# Patient Record
Sex: FEMALE | Race: WHITE | NOT HISPANIC OR LATINO | Employment: UNEMPLOYED | URBAN - METROPOLITAN AREA
[De-identification: names, ages, dates, MRNs, and addresses within clinical notes are randomized per-mention and may not be internally consistent; named-entity substitution may affect disease eponyms.]

---

## 2018-04-03 ENCOUNTER — OFFICE VISIT CONVERTED (OUTPATIENT)
Dept: NEUROLOGY | Facility: CLINIC | Age: 53
End: 2018-04-03
Attending: PSYCHIATRY & NEUROLOGY

## 2018-09-10 ENCOUNTER — CONVERSION ENCOUNTER (OUTPATIENT)
Dept: SURGERY | Facility: CLINIC | Age: 53
End: 2018-09-10

## 2018-09-10 ENCOUNTER — OFFICE VISIT CONVERTED (OUTPATIENT)
Dept: UROLOGY | Facility: CLINIC | Age: 53
End: 2018-09-10
Attending: UROLOGY

## 2018-09-12 ENCOUNTER — CONVERSION ENCOUNTER (OUTPATIENT)
Dept: NEUROLOGY | Facility: CLINIC | Age: 53
End: 2018-09-12

## 2018-09-12 ENCOUNTER — OFFICE VISIT CONVERTED (OUTPATIENT)
Dept: NEUROLOGY | Facility: CLINIC | Age: 53
End: 2018-09-12
Attending: PSYCHIATRY & NEUROLOGY

## 2018-10-25 ENCOUNTER — OFFICE VISIT CONVERTED (OUTPATIENT)
Dept: NEUROLOGY | Facility: CLINIC | Age: 53
End: 2018-10-25
Attending: PSYCHIATRY & NEUROLOGY

## 2018-11-21 ENCOUNTER — OFFICE VISIT CONVERTED (OUTPATIENT)
Dept: UROLOGY | Facility: CLINIC | Age: 53
End: 2018-11-21
Attending: UROLOGY

## 2018-11-21 ENCOUNTER — CONVERSION ENCOUNTER (OUTPATIENT)
Dept: SURGERY | Facility: CLINIC | Age: 53
End: 2018-11-21

## 2019-03-26 ENCOUNTER — OFFICE VISIT CONVERTED (OUTPATIENT)
Dept: NEUROLOGY | Facility: CLINIC | Age: 54
End: 2019-03-26
Attending: PSYCHIATRY & NEUROLOGY

## 2019-03-26 ENCOUNTER — CONVERSION ENCOUNTER (OUTPATIENT)
Dept: NEUROLOGY | Facility: CLINIC | Age: 54
End: 2019-03-26

## 2019-05-17 ENCOUNTER — OFFICE VISIT CONVERTED (OUTPATIENT)
Dept: NEUROLOGY | Facility: CLINIC | Age: 54
End: 2019-05-17
Attending: PSYCHIATRY & NEUROLOGY

## 2019-06-13 ENCOUNTER — CONVERSION ENCOUNTER (OUTPATIENT)
Dept: SURGERY | Facility: CLINIC | Age: 54
End: 2019-06-13

## 2019-06-13 ENCOUNTER — OFFICE VISIT CONVERTED (OUTPATIENT)
Dept: UROLOGY | Facility: CLINIC | Age: 54
End: 2019-06-13
Attending: UROLOGY

## 2019-07-05 ENCOUNTER — OFFICE VISIT CONVERTED (OUTPATIENT)
Dept: NEUROLOGY | Facility: CLINIC | Age: 54
End: 2019-07-05
Attending: PSYCHIATRY & NEUROLOGY

## 2019-08-13 ENCOUNTER — CONVERSION ENCOUNTER (OUTPATIENT)
Dept: NEUROLOGY | Facility: CLINIC | Age: 54
End: 2019-08-13

## 2019-08-13 ENCOUNTER — OFFICE VISIT CONVERTED (OUTPATIENT)
Dept: NEUROLOGY | Facility: CLINIC | Age: 54
End: 2019-08-13
Attending: PSYCHIATRY & NEUROLOGY

## 2019-09-09 ENCOUNTER — OFFICE VISIT CONVERTED (OUTPATIENT)
Dept: NEUROLOGY | Facility: CLINIC | Age: 54
End: 2019-09-09
Attending: PSYCHIATRY & NEUROLOGY

## 2019-09-26 ENCOUNTER — OFFICE VISIT CONVERTED (OUTPATIENT)
Dept: ORTHOPEDIC SURGERY | Facility: CLINIC | Age: 54
End: 2019-09-26
Attending: ORTHOPAEDIC SURGERY

## 2019-10-21 ENCOUNTER — HOSPITAL ENCOUNTER (OUTPATIENT)
Dept: PERIOP | Facility: HOSPITAL | Age: 54
Setting detail: HOSPITAL OUTPATIENT SURGERY
Discharge: HOME | End: 2019-10-21
Attending: ORTHOPAEDIC SURGERY

## 2019-11-05 ENCOUNTER — OFFICE VISIT CONVERTED (OUTPATIENT)
Dept: ORTHOPEDIC SURGERY | Facility: CLINIC | Age: 54
End: 2019-11-05
Attending: ORTHOPAEDIC SURGERY

## 2019-11-14 ENCOUNTER — OFFICE VISIT CONVERTED (OUTPATIENT)
Dept: ORTHOPEDIC SURGERY | Facility: CLINIC | Age: 54
End: 2019-11-14
Attending: ORTHOPAEDIC SURGERY

## 2019-12-19 ENCOUNTER — CONVERSION ENCOUNTER (OUTPATIENT)
Dept: ORTHOPEDIC SURGERY | Facility: CLINIC | Age: 54
End: 2019-12-19

## 2019-12-19 ENCOUNTER — OFFICE VISIT CONVERTED (OUTPATIENT)
Dept: ORTHOPEDIC SURGERY | Facility: CLINIC | Age: 54
End: 2019-12-19
Attending: PHYSICIAN ASSISTANT

## 2020-01-30 ENCOUNTER — CONVERSION ENCOUNTER (OUTPATIENT)
Dept: ORTHOPEDIC SURGERY | Facility: CLINIC | Age: 55
End: 2020-01-30

## 2020-01-30 ENCOUNTER — OFFICE VISIT CONVERTED (OUTPATIENT)
Dept: ORTHOPEDIC SURGERY | Facility: CLINIC | Age: 55
End: 2020-01-30
Attending: PHYSICIAN ASSISTANT

## 2020-02-06 ENCOUNTER — OFFICE VISIT CONVERTED (OUTPATIENT)
Dept: ORTHOPEDIC SURGERY | Facility: CLINIC | Age: 55
End: 2020-02-06
Attending: PHYSICIAN ASSISTANT

## 2020-03-27 ENCOUNTER — TELEMEDICINE CONVERTED (OUTPATIENT)
Dept: ORTHOPEDIC SURGERY | Facility: CLINIC | Age: 55
End: 2020-03-27
Attending: ORTHOPAEDIC SURGERY

## 2020-05-15 ENCOUNTER — OFFICE VISIT CONVERTED (OUTPATIENT)
Dept: ORTHOPEDIC SURGERY | Facility: CLINIC | Age: 55
End: 2020-05-15
Attending: ORTHOPAEDIC SURGERY

## 2020-05-15 ENCOUNTER — CONVERSION ENCOUNTER (OUTPATIENT)
Dept: ORTHOPEDIC SURGERY | Facility: CLINIC | Age: 55
End: 2020-05-15

## 2020-06-26 ENCOUNTER — OFFICE VISIT CONVERTED (OUTPATIENT)
Dept: NEUROLOGY | Facility: CLINIC | Age: 55
End: 2020-06-26
Attending: PSYCHIATRY & NEUROLOGY

## 2020-07-02 ENCOUNTER — OFFICE VISIT CONVERTED (OUTPATIENT)
Dept: ORTHOPEDIC SURGERY | Facility: CLINIC | Age: 55
End: 2020-07-02
Attending: ORTHOPAEDIC SURGERY

## 2021-05-13 NOTE — PROGRESS NOTES
Progress Note      Patient Name: Katrin Parham   Patient ID: [id number]   Sex: Female   YOB: 1965    Primary Care Provider: Pat ANDERSON   Referring Provider: Pat ANDERSON    Visit Date: June 26, 2020    Provider: Bret Hartman MD   Location: [facility]   Location Address: [address] [place] [address]  [id number]   Location Phone: [phone]          Chief Complaint     RUE pain numbness and tingling       History Of Present Illness  Katrin Parham is a 55 year old /White female who presents today to [facility] today referred from Pat ANDERSON.      55-year-old woman here for follow-up of nerve conduction study.  She had carpal tunnel surgery to the right hand in October and she states that did not get any better.  She still gets numbness and tingling in her hand when she does activities of daily living and she drops things all the time.  Her left hand is not doing as much as the right hand.  She had carpal tunnel surgery on the left hand in the past and it was more successful.       Past Medical History  Abnormal MRI; Acute cystitis; Allergic rhinitis, chronic; Arthritis; Bladder Disorder; Bladder spasms; Chest pain; Chronic migraine; Degenerative Disc Disease ; Heart Disease; High blood pressure; Hyperlipemia; Hyperlipidemia; Hypertension; Incomplete bladder emptying; Leg pain; Limb Pain; Limb Swelling; Migraines; Multiple sclerosis; Muscle cramps; Neurogenic bladder; Pain; Pelvic floor dysfunction; Reflux; Alexandru Mountain spotted fever; Seasonal allergies; Self-catheterizes urinary bladder; Urinary Retention         Past Surgical History  Arthroscopic Knee Surgery; Eye Implant; EYE SURGERY; Hand surgery; Hysterectomy; Hysterectomy-Abdominal         Medication List  amitriptyline 100 mg oral tablet; Carafate oral; CBD oil; Crestor 10 mg oral tablet; esomeprazole magnesium oral; famotidine oral; Hyzaar 50-12.5 mg oral  tablet; losartan-hydrochlorothiazide 50-12.5 mg oral tablet; memantine oral; Neurontin 600 mg oral tablet; oxycodone 10 mg oral tablet; Prozac 40 mg oral capsule; Topamax 100 mg oral tablet; Voltaren 1 % topical gel; Zanaflex 4 mg oral capsule         Allergy List  Codeine Phosphate; Codeine Sulfate; erythromycin; hydrocodone-acetaminophen; methadone; SULFA (SULFONAMIDES)         Family Medical History  Liver Neoplasm, Malignant; Stomach Neoplasm, Malignant; Stroke; Heart Disease; Cancer, Unspecified; Diabetes, unspecified type; Esophagus Neoplasm, Malignant; Osteoporosis; Family history of Arthritis; Family history of cancer; Family history of heart disease; Family history of diabetes mellitus; Family history of osteoporosis         Social History  Alcohol (Never); Alcohol Use (Never); Caffeine (Current some day); Denies substance abuse (Never); lives with spouse; ; .; Recreational Drug Use (Never); Tobacco (Current every day)         Review of Systems  · Constitutional  o Denies  o : chills, excessive sweating, fatigue, fever, sycope/passing out, weight gain, weight loss  · Eyes  o Denies  o : changes in vision, blurry vision, double vision  · HENT  o Denies  o : loss of hearing, ringing in the ears, ear aches, sore throat, nasal congestion, sinus pain, nose bleeds, seasonal allergies  · Cardiovascular  o Denies  o : blood clots, swollen legs, anemia, easy burising or bleeding, transfusions  · Respiratory  o Denies  o : shortness of breath, dry cough, productive cough, pneumonia, COPD  · Gastrointestinal  o Denies  o : difficulty swallowing, reflux  · Genitourinary  o Denies  o : incontinence  · Neurologic  o Admits  o : difficulty with sleep  o Denies  o : headache, seizure, stroke, tremor, loss of balance, falls, dizziness/vertigo, numbness/tingling/paresthesia , difficulty with coordination, difficulty with dexterity, weakness  · Musculoskeletal  o Admits  o : neck stiffness/pain, weakness, pain  "radiating in arm  o Denies  o : swollen lymph nodes, muscle aches, joint pain, spasms, sciatica, pain radiating in leg, low back pain  · Endocrine  o Denies  o : diabetes, thyroid disorder  · Psychiatric  o Denies  o : anxiety, depression      Vitals  Date Time BP Position Site L\R Cuff Size HR RR TEMP (F) WT  HT  BMI kg/m2 BSA m2 O2 Sat HC       06/26/2020 02:23 /90 Sitting    100 - R 18 98.2 200lbs 0oz 5'  1\" 37.79 1.98           Physical Examination     There is no weakness of the upper extremities individual muscle testing.  There is no weakness of the thumb muscles.  Phalen sign is positive on the right hand.  Pressure there is presence tingling in both hands.  Tinel sign is positive in the right hand.           Assessment  · Carpal tunnel syndrome     354.0/G56.00  She is to follow-up with Dr. Dowell and perhaps do the carpal tunnel surgery again.  · Numbness and tingling       Anesthesia of skin     782.0/R20.0  Paresthesia of skin     782.0/R20.2  This study is abnormal and shows electrophysiologic evidence for moderate carpal tunnel syndrome on the right side and mild on the left side. This is essentially unchanged from the nerve conduction study that was performed last year.    Problems Reconciled  Plan  · Orders  o Nerve conduction studies; 5-6 studies (80742) - 354.0/G56.00, 782.0/R20.0, 782.0/R20.2 - 06/26/2020  · Medications  o Medications have been Reconciled  o Transition of Care or Provider Policy  · Instructions  o Encouraged to follow-up with Primary Care Provider for preventative care.            Electronically Signed by: Bret Hartman MD -Author on June 26, 2020 04:00:24 PM  "

## 2021-05-13 NOTE — PROGRESS NOTES
Progress Note      Patient Name: Katrin Parham   Patient ID: [id number]   Sex: Female   YOB: 1965    Primary Care Provider: Pat ANDERSON   Referring Provider: Pat ANDERSON    Visit Date: July 2, 2020    Provider: Zach Dowell MD   Location: [facility]   Location Address: [address] [place] [address]  [id number]   Location Phone: [phone]          Chief Complaint  · Follow up Bilateral upper extremities       History Of Present Illness  Katrin Parham is a 55 year old /White female who presents today to [facility].      The patient presents today for a follow up with her wrist pain. She had carpal tunnel surgery to the right hand in October and she states that did not get any better.  She still gets numbness and tingling in her hand when she does activities of daily living and she drops things all the time.  She is still having a lot of trouble.              Past Medical History  Abnormal MRI; Acute cystitis; Allergic rhinitis, chronic; Arthritis; Bladder Disorder; Bladder spasms; Chest pain; Chronic migraine; Degenerative Disc Disease ; Heart Disease; High blood pressure; Hyperlipemia; Hyperlipidemia; Hypertension; Incomplete Bladder Emptying; Leg pain; Limb Pain; Limb Swelling; Migraines; Multiple sclerosis; Muscle cramps; Neurogenic Bladder; Pain; Pelvic floor dysfunction; Reflux; Alexandru Mountain spotted fever; Seasonal allergies; Self-catheterizes urinary bladder; Urinary Retention         Past Surgical History  Arthroscopic Knee Surgery; Eye Implant; EYE SURGERY; Hand surgery; Hysterectomy; Hysterectomy-Abdominal         Medication List  amitriptyline 100 mg oral tablet; Carafate oral; CBD oil; Crestor 10 mg oral tablet; esomeprazole magnesium oral; famotidine oral; Hyzaar 50-12.5 mg oral tablet; losartan-hydrochlorothiazide 50-12.5 mg oral tablet; memantine oral; Neurontin 600 mg oral tablet; oxycodone 10 mg oral tablet; Prozac  "40 mg oral capsule; Topamax 100 mg oral tablet; Voltaren 1 % topical gel; Zanaflex 4 mg oral capsule         Allergy List  Codeine Phosphate; Codeine Sulfate; erythromycin; hydrocodone-acetaminophen; methadone; SULFA (SULFONAMIDES)         Family Medical History  Liver Neoplasm, Malignant; Stomach Neoplasm, Malignant; Stroke; Heart Disease; Cancer, Unspecified; Diabetes, unspecified type; Esophagus Neoplasm, Malignant; Osteoporosis; Family history of Arthritis; Family history of cancer; Family history of heart disease; Family history of diabetes mellitus; Family history of osteoporosis         Social History  Alcohol (Never); Alcohol Use (Never); Caffeine (Current some day); Denies substance abuse (Never); lives with spouse; ; .; Recreational Drug Use (Never); Tobacco (Current every day)         Review of Systems  · Constitutional  o Denies  o : fever, chills, weight loss  · Cardiovascular  o Denies  o : chest pain, shortness of breath  · Gastrointestinal  o Denies  o : liver disease, heartburn, nausea, blood in stools  · Genitourinary  o Denies  o : painful urination, blood in urine  · Integument  o Denies  o : rash, itching  · Neurologic  o Denies  o : headache, weakness, loss of consciousness  · Musculoskeletal  o Denies  o : painful, swollen joints  · Psychiatric  o Denies  o : drug/alcohol addiction, anxiety, depression      Vitals  Date Time BP Position Site L\R Cuff Size HR RR TEMP (F) WT  HT  BMI kg/m2 BSA m2 O2 Sat        07/02/2020 10:33 AM      90 - R   198lbs 2oz 5'  1\" 37.44 1.97 95 %          Physical Examination  · Constitutional  o Appearance  o : well developed, well-nourished, no obvious deformities present  · Head and Face  o Head  o :   § Inspection  § : normocephalic  o Face  o :   § Inspection  § : no facial lesions  · Eyes  o Conjunctivae  o : conjunctivae normal  o Sclerae  o : sclerae white  · Ears, Nose, Mouth and Throat  o Ears  o :   § External Ears  § : appearance within " normal limits  § Hearing  § : intact  o Nose  o :   § External Nose  § : appearance normal  · Neck  o Inspection/Palpation  o : normal appearance  o Range of Motion  o : full range of motion  · Respiratory  o Respiratory Effort  o : breathing unlabored  o Inspection of Chest  o : normal appearance  o Auscultation of Lungs  o : no audible wheezing or rales  · Cardiovascular  o Heart  o : regular rate  · Gastrointestinal  o Abdominal Examination  o : soft and non-tender  · Skin and Subcutaneous Tissue  o General Inspection  o : intact, no rashes  · Psychiatric  o General  o : Alert and oriented x3  o Judgement and Insight  o : judgment and insight intact  o Mood and Affect  o : mood normal, affect appropriate  · Extremities  o Extremities  o : Bilateral wrist : Well healed scar to the hand, Rom is intact, sensation is intact.              Assessment  · Carpal Tunnel Syndrome : Right     354.0/G56.00  · Pain in both wrists       Pain in right wrist     719.43/M25.531  Pain in left wrist     719.43/M25.532  · Arthritis : Right Wrist     716.90/M19.90  · Right wrist cyst     727.51/M71.20      Plan  · Medications  o Medications have been Reconciled  o Transition of Care or Provider Policy  · Instructions  o Dr. Dowell saw and examined the patient and agrees with plan.   o Reviewed the patient's Past Medical, Social, and Family history as well as the ROS at today's visit, no changes.  o Call or return if worsening symptoms.  o The above service was scribed by Rush Crocker on my behalf and I attest to the accuracy of the note. jsb  o We discussed the plan with the patient, we discussed having surgery- re-release carpal tunnel. If the wrist is having a lot of trouble. We will refer her to a hand surgeon for an evaluation.             Electronically Signed by: Ronal Crocker - , Other -Author on July 8, 2020 10:32:52 AM  Electronically Co-signed by: Zach Dowell MD -Reviewer on July 8, 2020  08:54:41 PM

## 2021-05-13 NOTE — PROGRESS NOTES
Progress Note      Patient Name: Katrin Parham   Patient ID: [id number]   Sex: Female   YOB: 1965    Primary Care Provider: Pat ANDERSON   Referring Provider: Pat ANDERSON    Visit Date: May 15, 2020    Provider: Zach Dowell MD   Location: [facility]   Location Address: [address] [place] [address]  [id number]   Location Phone: [phone]          Chief Complaint  · Right wrist pain      History Of Present Illness  Katrin Parham is a 55 year old /White female who presents today to [facility].      The patient presents today for evaluation of right wrist and thumb. She was last evaluated by tele health visit on March 27th. We had referred her to  [facility] for her hand and thumb as she had degenerative changes noted on her MRI with a possible synovial cyst. However the consultation was not very helpful. I reviewed their note today and basically they say she is only having residual carpal tunnel pillar pain and a referral back to the treating surgeon. They did not treat the other findings on her MRI. She reports thumb pain as well as wrist pain today. She complains of numbness and tingling as well as pain in her thumb. She reports a nodule over the lower MCP joint of the thumb with catching and locking of the thumb. She has not tried any treatment of this recently.       Past Medical History  Abnormal MRI; Acute cystitis; Allergic rhinitis, chronic; Arthritis; Bladder Disorder; Bladder spasms; Chest pain; Chronic migraine; Degenerative Disc Disease ; Heart Disease; High blood pressure; Hyperlipemia; Hyperlipidemia; Hypertension; Incomplete bladder emptying; Leg pain; Limb Pain; Limb Swelling; Migraines; Multiple sclerosis; Muscle cramps; Neurogenic bladder; Pain; Pelvic floor dysfunction; Reflux; Alexandru Mountain spotted fever; Seasonal allergies; Self-catheterizes urinary bladder; Urinary Retention         Past Surgical  History  Arthroscopic Knee Surgery; Eye Implant; EYE SURGERY; Hand surgery; Hysterectomy; Hysterectomy-Abdominal         Medication List  amitriptyline 100 mg oral tablet; Carafate oral; CBD oil; Crestor 10 mg oral tablet; esomeprazole magnesium oral; famotidine oral; Hyzaar 50-12.5 mg oral tablet; losartan-hydrochlorothiazide 50-12.5 mg oral tablet; memantine oral; Neurontin 600 mg oral tablet; oxycodone 10 mg oral tablet; Prozac 40 mg oral capsule; Topamax 100 mg oral tablet; Voltaren 1 % topical gel; Zanaflex 4 mg oral capsule         Allergy List  Codeine Phosphate; Codeine Sulfate; erythromycin; hydrocodone-acetaminophen; methadone; SULFA (SULFONAMIDES)         Family Medical History  Liver Neoplasm, Malignant; Stomach Neoplasm, Malignant; Stroke; Heart Disease; Cancer, Unspecified; Diabetes, unspecified type; Esophagus Neoplasm, Malignant; Osteoporosis; Family history of Arthritis; Family history of cancer; Family history of heart disease; Family history of diabetes mellitus; Family history of osteoporosis         Social History  Alcohol (Never); Alcohol Use (Never); Caffeine (Current some day); Denies substance abuse (Never); lives with spouse; ; .; Recreational Drug Use (Never); Tobacco (Current every day)         Review of Systems  · Constitutional  o Denies  o : fever, chills, weight loss  · Cardiovascular  o Denies  o : chest pain, shortness of breath  · Gastrointestinal  o Denies  o : liver disease, heartburn, nausea, blood in stools  · Genitourinary  o Denies  o : painful urination, blood in urine  · Integument  o Denies  o : rash, itching  · Neurologic  o Denies  o : headache, weakness, loss of consciousness  · Musculoskeletal  o Denies  o : painful, swollen joints  · Psychiatric  o Denies  o : drug/alcohol addiction, anxiety, depression      Vitals  Date Time BP Position Site L\R Cuff Size HR RR TEMP (F) WT  HT  BMI kg/m2 BSA m2 O2 Sat        05/15/2020 10:03 AM         193lbs 16oz 5'  " 1\" 36.66 1.95           Physical Examination  · Constitutional  o Appearance  o : well developed, well-nourished, no obvious deformities present  · Head and Face  o Head  o :   § Inspection  § : normocephalic  o Face  o :   § Inspection  § : no facial lesions  · Eyes  o Conjunctivae  o : conjunctivae normal  o Sclerae  o : sclerae white  · Ears, Nose, Mouth and Throat  o Ears  o :   § External Ears  § : appearance within normal limits  § Hearing  § : intact  o Nose  o :   § External Nose  § : appearance normal  · Neck  o Inspection/Palpation  o : normal appearance  o Range of Motion  o : full range of motion  · Respiratory  o Respiratory Effort  o : breathing unlabored  o Inspection of Chest  o : normal appearance  o Auscultation of Lungs  o : no audible wheezing or rales  · Cardiovascular  o Heart  o : regular rate  · Gastrointestinal  o Abdominal Examination  o : soft and non-tender  · Skin and Subcutaneous Tissue  o General Inspection  o : intact, no rashes  · Psychiatric  o General  o : Alert and oriented x3  o Judgement and Insight  o : judgment and insight intact  o Mood and Affect  o : mood normal, affect appropriate  · Right Wrist  o Inspection  o : Tender over the radial ulnar pillars of the wrist more so on the radial side. Tenderness over the MCP joint. Pain with grind test. Mild tenderness thumb CMC joint. Mild tenderness over IP joint. No out right catching or locking but there is some pain with flexion and extension of the thumb. Positive Tinnel. Incision is well healed.   · Injection Note/Aspiration Note  o Site  o : Right Thumb  o Procedure  o : Procedure: After educating the patient, patient gave consent for procedure. After using Chloraprep, the joint space was injected. The patient tolerated the procedure well.   o Medication  o : 80 mg of DepoMedrol with 1cc of 1% Lidocaine          Assessment  · Right wrist pain     719.43/M25.531  · Right trigger thumb     727.03/M65.30  · Thumb pain, " right     729.5/M79.644  · Osteoarthritis: thumb     715.90/M19.90  · Paresthesia of the right hand     782.0/R20.2      Plan  · Orders  o Depo-Medrol injection 80mg () - - 05/15/2020   Lot [id number] Exp 05 2021 [employer] Administered by CIARA Dowell MD  o Arthrocentesis of minor joint (20600) - - 05/15/2020   Lot [id number] 07 01 2021 [employer] Administered by CIARA Dowell MD  · Medications  o Medications have been Reconciled  o Transition of Care or Provider Policy  · Instructions  o Dr. Dowell saw and examined the patient and agrees with plan.   o Reviewed the patient's Past Medical, Social, and Family history as well as the ROS at today's visit, no changes.  o Call or return if worsening symptoms.  o This note was transcribed by Rush Crocker. jsb.  o Discussed plan and treatment options with patient. Presentation at this point with some carpal tunnel type symptoms. Post operative pillar pain from previous carpal tunnel surgery, however she has generalized wrist pain including the thumb. She would like to try initial trigger thumb injection. We will set up a nerve conduction study and we will follow up after study to evaluate.             Electronically Signed by: Ronal Crocker - , Other -Author on May 22, 2020 10:48:22 AM  Electronically Co-signed by: Zach Dowell MD -Reviewer on May 25, 2020 10:40:57 PM

## 2021-05-15 VITALS — HEIGHT: 61 IN | WEIGHT: 194 LBS | BODY MASS INDEX: 36.63 KG/M2

## 2021-05-15 VITALS
RESPIRATION RATE: 18 BRPM | WEIGHT: 200 LBS | DIASTOLIC BLOOD PRESSURE: 90 MMHG | TEMPERATURE: 98.2 F | SYSTOLIC BLOOD PRESSURE: 140 MMHG | HEIGHT: 61 IN | BODY MASS INDEX: 37.76 KG/M2 | HEART RATE: 100 BPM

## 2021-05-15 VITALS
DIASTOLIC BLOOD PRESSURE: 86 MMHG | SYSTOLIC BLOOD PRESSURE: 130 MMHG | WEIGHT: 195.5 LBS | HEIGHT: 61 IN | BODY MASS INDEX: 36.91 KG/M2

## 2021-05-15 VITALS
BODY MASS INDEX: 37.38 KG/M2 | HEART RATE: 90 BPM | HEIGHT: 61 IN | WEIGHT: 198 LBS | DIASTOLIC BLOOD PRESSURE: 95 MMHG | SYSTOLIC BLOOD PRESSURE: 143 MMHG

## 2021-05-15 VITALS — OXYGEN SATURATION: 96 % | BODY MASS INDEX: 37.57 KG/M2 | HEIGHT: 61 IN | HEART RATE: 100 BPM | WEIGHT: 199 LBS

## 2021-05-15 VITALS — HEART RATE: 98 BPM | BODY MASS INDEX: 35.5 KG/M2 | HEIGHT: 61 IN | OXYGEN SATURATION: 94 % | WEIGHT: 188 LBS

## 2021-05-15 VITALS
BODY MASS INDEX: 35.87 KG/M2 | HEIGHT: 61 IN | DIASTOLIC BLOOD PRESSURE: 93 MMHG | WEIGHT: 190 LBS | SYSTOLIC BLOOD PRESSURE: 140 MMHG | HEART RATE: 100 BPM

## 2021-05-15 VITALS — WEIGHT: 196.5 LBS | OXYGEN SATURATION: 99 % | BODY MASS INDEX: 37.1 KG/M2 | HEART RATE: 112 BPM | HEIGHT: 61 IN

## 2021-05-15 VITALS — OXYGEN SATURATION: 96 % | HEIGHT: 61 IN | HEART RATE: 101 BPM

## 2021-05-15 VITALS — HEART RATE: 103 BPM | OXYGEN SATURATION: 97 % | HEIGHT: 61 IN

## 2021-05-15 VITALS — HEIGHT: 61 IN | BODY MASS INDEX: 36.63 KG/M2 | WEIGHT: 194 LBS

## 2021-05-15 VITALS
SYSTOLIC BLOOD PRESSURE: 141 MMHG | DIASTOLIC BLOOD PRESSURE: 96 MMHG | WEIGHT: 197.25 LBS | HEIGHT: 61 IN | BODY MASS INDEX: 37.24 KG/M2 | HEART RATE: 87 BPM

## 2021-05-15 VITALS — BODY MASS INDEX: 37.41 KG/M2 | HEART RATE: 90 BPM | HEIGHT: 61 IN | OXYGEN SATURATION: 95 % | WEIGHT: 198.12 LBS

## 2021-05-15 VITALS — WEIGHT: 193.5 LBS | BODY MASS INDEX: 36.53 KG/M2 | HEIGHT: 61 IN | OXYGEN SATURATION: 98 % | HEART RATE: 105 BPM

## 2021-05-16 VITALS
DIASTOLIC BLOOD PRESSURE: 68 MMHG | HEART RATE: 64 BPM | HEIGHT: 61 IN | SYSTOLIC BLOOD PRESSURE: 141 MMHG | WEIGHT: 183 LBS | BODY MASS INDEX: 34.55 KG/M2

## 2021-05-16 VITALS
WEIGHT: 183.37 LBS | DIASTOLIC BLOOD PRESSURE: 94 MMHG | HEIGHT: 61 IN | BODY MASS INDEX: 34.62 KG/M2 | SYSTOLIC BLOOD PRESSURE: 148 MMHG

## 2021-05-16 VITALS
WEIGHT: 186.31 LBS | HEIGHT: 61 IN | DIASTOLIC BLOOD PRESSURE: 69 MMHG | SYSTOLIC BLOOD PRESSURE: 117 MMHG | HEART RATE: 81 BPM | BODY MASS INDEX: 35.18 KG/M2

## 2021-05-16 VITALS
WEIGHT: 180 LBS | HEIGHT: 61 IN | SYSTOLIC BLOOD PRESSURE: 144 MMHG | DIASTOLIC BLOOD PRESSURE: 90 MMHG | BODY MASS INDEX: 33.99 KG/M2

## 2021-05-16 VITALS
HEIGHT: 61 IN | WEIGHT: 180.19 LBS | DIASTOLIC BLOOD PRESSURE: 91 MMHG | HEART RATE: 82 BPM | SYSTOLIC BLOOD PRESSURE: 151 MMHG | BODY MASS INDEX: 34.02 KG/M2

## 2022-03-07 ENCOUNTER — OFFICE VISIT (OUTPATIENT)
Dept: OTOLARYNGOLOGY | Facility: CLINIC | Age: 57
End: 2022-03-07

## 2022-03-07 ENCOUNTER — PROCEDURE VISIT (OUTPATIENT)
Dept: OTOLARYNGOLOGY | Facility: CLINIC | Age: 57
End: 2022-03-07

## 2022-03-07 VITALS — TEMPERATURE: 98.1 F | HEIGHT: 61 IN | BODY MASS INDEX: 39.31 KG/M2 | WEIGHT: 208.2 LBS

## 2022-03-07 DIAGNOSIS — R42 DIZZINESS AND GIDDINESS: ICD-10-CM

## 2022-03-07 DIAGNOSIS — H93.13 TINNITUS OF BOTH EARS: ICD-10-CM

## 2022-03-07 DIAGNOSIS — H90.3 SENSORINEURAL HEARING LOSS (SNHL) OF BOTH EARS: Primary | ICD-10-CM

## 2022-03-07 DIAGNOSIS — H93.8X3 EAR PRESSURE, BILATERAL: ICD-10-CM

## 2022-03-07 DIAGNOSIS — H90.3 SENSORY HEARING LOSS, BILATERAL: ICD-10-CM

## 2022-03-07 DIAGNOSIS — M26.69 TMJ INFLAMMATION: ICD-10-CM

## 2022-03-07 PROCEDURE — 99203 OFFICE O/P NEW LOW 30 MIN: CPT | Performed by: NURSE PRACTITIONER

## 2022-03-07 PROCEDURE — 92567 TYMPANOMETRY: CPT | Performed by: AUDIOLOGIST

## 2022-03-07 PROCEDURE — 92557 COMPREHENSIVE HEARING TEST: CPT | Performed by: AUDIOLOGIST

## 2022-03-07 RX ORDER — RIMEGEPANT SULFATE 75 MG/75MG
TABLET, ORALLY DISINTEGRATING ORAL
COMMUNITY
Start: 2022-03-03

## 2022-03-07 RX ORDER — DESVENLAFAXINE SUCCINATE 50 MG/1
50 TABLET, EXTENDED RELEASE ORAL DAILY
COMMUNITY
Start: 2022-02-19

## 2022-03-07 RX ORDER — VALSARTAN 160 MG/1
TABLET ORAL
COMMUNITY
Start: 2022-02-14

## 2022-03-07 RX ORDER — FEXOFENADINE HCL 180 MG/1
TABLET ORAL
COMMUNITY

## 2022-03-07 RX ORDER — FAMOTIDINE 20 MG/1
TABLET, FILM COATED ORAL
COMMUNITY
Start: 2021-12-22

## 2022-03-07 RX ORDER — ALBUTEROL SULFATE 90 UG/1
AEROSOL, METERED RESPIRATORY (INHALATION)
COMMUNITY
Start: 2022-01-18

## 2022-03-07 RX ORDER — L. ACIDOPHILUS/LACTOBAC SPOR 35MM-25MM
1 TABLET ORAL EVERY MORNING
COMMUNITY
Start: 2022-02-14

## 2022-03-07 RX ORDER — HYOSCYAMINE SULFATE 0.12 MG/1
TABLET SUBLINGUAL
COMMUNITY

## 2022-03-07 RX ORDER — GALCANEZUMAB 120 MG/ML
120 INJECTION, SOLUTION SUBCUTANEOUS
COMMUNITY
End: 2022-09-30

## 2022-03-07 RX ORDER — AMLODIPINE BESYLATE 5 MG/1
TABLET ORAL
COMMUNITY
Start: 2022-02-14

## 2022-03-07 RX ORDER — LIDOCAINE HYDROCHLORIDE AND EPINEPHRINE 10; 10 MG/ML; UG/ML
1 INJECTION, SOLUTION INFILTRATION; PERINEURAL
COMMUNITY
End: 2022-09-30

## 2022-03-07 RX ORDER — NITROFURANTOIN MACROCRYSTALS 50 MG/1
50 CAPSULE ORAL
COMMUNITY
Start: 2022-02-14

## 2022-03-07 RX ORDER — TIZANIDINE 4 MG/1
TABLET ORAL
COMMUNITY
Start: 2022-02-14

## 2022-03-07 RX ORDER — DEXTROMETHORPHAN HYDROBROMIDE AND PROMETHAZINE HYDROCHLORIDE 15; 6.25 MG/5ML; MG/5ML
SYRUP ORAL
COMMUNITY
Start: 2022-01-18

## 2022-03-07 RX ORDER — FLUCONAZOLE 150 MG/1
TABLET ORAL
COMMUNITY
Start: 2022-01-06 | End: 2022-09-30

## 2022-03-07 RX ORDER — MELOXICAM 15 MG/1
15 TABLET ORAL DAILY
COMMUNITY
Start: 2022-03-02

## 2022-03-07 RX ORDER — AMITRIPTYLINE HYDROCHLORIDE 25 MG/1
25 TABLET, FILM COATED ORAL DAILY
COMMUNITY
Start: 2022-02-14

## 2022-03-07 RX ORDER — PANTOPRAZOLE SODIUM 40 MG/1
40 TABLET, DELAYED RELEASE ORAL 2 TIMES DAILY
COMMUNITY
Start: 2022-02-14

## 2022-03-07 RX ORDER — OXYCODONE HYDROCHLORIDE 10 MG/1
TABLET ORAL
COMMUNITY
Start: 2022-02-17

## 2022-03-07 RX ORDER — LANCETS
EACH MISCELLANEOUS
COMMUNITY
Start: 2022-02-18

## 2022-03-07 RX ORDER — ROSUVASTATIN CALCIUM 10 MG/1
10 TABLET, COATED ORAL DAILY
COMMUNITY
Start: 2022-02-14

## 2022-03-07 RX ORDER — MONTELUKAST SODIUM 10 MG/1
10 TABLET ORAL EVERY MORNING
COMMUNITY
Start: 2022-02-14

## 2022-03-07 RX ORDER — HYDROCHLOROTHIAZIDE 12.5 MG/1
TABLET ORAL
COMMUNITY
Start: 2022-02-14

## 2022-03-07 RX ORDER — BLOOD SUGAR DIAGNOSTIC
STRIP MISCELLANEOUS
COMMUNITY
Start: 2022-02-18

## 2022-03-07 RX ORDER — GABAPENTIN 600 MG/1
1200 TABLET ORAL 3 TIMES DAILY
COMMUNITY
Start: 2022-02-17

## 2022-03-07 RX ORDER — BUTALBITAL, ACETAMINOPHEN AND CAFFEINE 50; 325; 40 MG/1; MG/1; MG/1
TABLET ORAL
COMMUNITY

## 2022-03-07 RX ORDER — CHOLECALCIFEROL (VITAMIN D3) 1250 MCG
50000 CAPSULE ORAL WEEKLY
COMMUNITY
Start: 2022-02-14

## 2022-03-07 RX ORDER — ASPIRIN 81 MG/1
TABLET ORAL
COMMUNITY

## 2022-03-07 NOTE — PROGRESS NOTES
Patient Name: Katrin Parham   Visit Date: 03/07/2022   Patient ID: [id number]  Provider: MONICA Whitaker    Sex: female  Location: [facility]   YOB: 1965  Location Address: [address] [place] [address], [address] [place] [address],?[place]?[id number]    Primary Care Provider Ynes Ceja APRN  Location Phone: [phone]    Referring Provider: MONICA Brower        Chief Complaint  Otitis Media    Subjective   Katrin Parham is a 56 y.o. female who presents to [facility] today as a consult from MONICA Brower for evaluation of her ears.  She states over the past year she has been having a pressure sensation in her ears.  She states she feels it all the time and it is sometimes painful.  She states she feels it whenever she opens her mouth.  She also states she has having bilateral ringing.  She feels like her hearing is decreased.  She states last year she was diagnosed with Ménière's disease by her PCP after experiencing vertigo symptoms.  Upon review of her records it looks like she was diagnosed with BPPV and sent for treatment.  She states she has a history of multiple sclerosis and multiple joint pain issues.  She ambulates with the use of a cane.      Current Outpatient Medications on File Prior to Visit   Medication Sig   • Accu-Chek FastClix Lancets misc Use as directed to monitor blood glucose THREE TIMES DAILY and AS NEEDED   • Accu-Chek Guide test strip Use as directed to monitor blood glucose THREE TIMES DAILY and AS NEEDED   • albuterol sulfate  (90 Base) MCG/ACT inhaler INHALE 2 PUFFS BY MOUTH EVERY 4 HOURS SHAKE WELL   • amitriptyline (ELAVIL) 25 MG tablet Take 25 mg by mouth Daily.   • amLODIPine (NORVASC) 5 MG tablet TAKE 1 TABLET BY MOUTH EVERY DAY WITH VALSARTAN AND HCTZ   • aspirin 81 MG EC tablet aspirin 81 mg tablet,delayed release   • butalbital-acetaminophen-caffeine (FIORICET, ESGIC) -40 MG per tablet  butalbital-acetaminophen-caff -40 mg oral tablet take 1 - 2 tablets by oral route every 4 hours as needed not to exceed 6 tablets per 24hrs   Suspended   • Cholecalciferol (Vitamin D3) 1.25 MG (27002 UT) capsule Take 50,000 Units by mouth 1 (One) Time Per Week.   • desvenlafaxine (PRISTIQ) 50 MG 24 hr tablet Take 50 mg by mouth Daily.   • Erenumab-aooe (AIMOVIG) 70 MG/ML prefilled syringe 1 mL.   • famotidine (PEPCID) 20 MG tablet TAKE 1 TABLET BY MOUTH TWICE DAILY FOR 14 DAYS   • fexofenadine (ALLEGRA) 180 MG tablet fexofenadine 180 mg tablet   TAKE 1 TABLET BY MOUTH EVERY DAY   • fluconazole (DIFLUCAN) 150 MG tablet TAKE 1 TABLET BY MOUTH NOW, REPEAT DOSE IN 4 DAYS   • gabapentin (NEURONTIN) 600 MG tablet Take 1,200 mg by mouth 3 (Three) Times a Day.   • galcanezumab-gnlm (Emgality) 120 MG/ML auto-injector pen 120 mg.   • hydroCHLOROthiazide (HYDRODIURIL) 12.5 MG tablet TAKE 1 TABLET BY MOUTH EVERY DAY ALONG WITH AMLODIPINE AND VALSARTAN   • Hyoscyamine Sulfate SL 0.125 MG sublingual tablet hyoscyamine 0.125 mg sublingual tablet   PLACE 1 TABLET UNDER THE TONGUE EVERY 4 HOURS AS NEEDED   • Lactobacillus (Acidophilus/L-Sporogenes) tablet Take 1 tablet by mouth Every Morning.   • lidocaine 1% - EPINEPHrine 1:210870 (XYLOCAINE W/EPI) 1 %-1:137608 injection 1 mL.   • linaclotide (LINZESS) 145 MCG capsule capsule Linzess 145 mcg capsule   TAKE 1 CAPSULE BY MOUTH EVERY DAY   • meloxicam (MOBIC) 15 MG tablet Take 15 mg by mouth Daily. with food   • metFORMIN (GLUCOPHAGE) 500 MG tablet Take 500 mg by mouth Daily.   • montelukast (SINGULAIR) 10 MG tablet Take 10 mg by mouth Every Morning.   • nitrofurantoin (MACRODANTIN) 50 MG capsule Take 50 mg by mouth every night at bedtime.   • Nurtec 75 MG dispersible tablet Take 1 tablet every other day and as needed daily for migraine prevention   • oxyCODONE (ROXICODONE) 10 MG tablet TAKE 1 TABLET BY MOUTH EVERY 6 HOURS AS NEEDED FOR PAIN MAY CAUSE DROWSINESS   • pantoprazole  "(PROTONIX) 40 MG EC tablet Take 40 mg by mouth 2 (Two) Times a Day.   • promethazine-dextromethorphan (PROMETHAZINE-DM) 6.25-15 MG/5ML syrup TAKE 5ML BY MOUTH EVERY 4 TO 6 HOURS MAY CAUSE DROWSINESS   • rosuvastatin (CRESTOR) 10 MG tablet Take 10 mg by mouth Daily.   • tiZANidine (ZANAFLEX) 4 MG tablet TAKE 1 TO 2 TABLETS BY MOUTH THREE TIMES DAILY AS NEEDED MAY CAUSE DROWSINESS   • valsartan (DIOVAN) 160 MG tablet TAKE 1 TABLET BY MOUTH EVERY DAY along with amlodipine and hctz     No current facility-administered medications on file prior to visit.        Social History     Tobacco Use   • Smoking status: Current Every Day Smoker     Packs/day: 0.50     Years: 10.00     Pack years: 5.00     Types: Cigarettes   • Smokeless tobacco: Never Used   Vaping Use   • Vaping Use: Never used   Substance Use Topics   • Drug use: Defer       Objective     Vital Signs:   Temp 98.1 °F (36.7 °C) (Temporal)   Ht 154.9 cm (61\")   Wt 94.4 kg (208 lb 3.2 oz)   BMI 39.34 kg/m²       Physical Exam  Constitutional:       General: She is not in acute distress.     Appearance: Normal appearance. She is not ill-appearing.   HENT:      Head: Normocephalic and atraumatic.      Jaw: There is normal jaw occlusion. Tenderness and pain on movement present.      Salivary Glands: Right salivary gland is not diffusely enlarged or tender. Left salivary gland is not diffusely enlarged or tender.      Right Ear: Tympanic membrane, ear canal and external ear normal.      Left Ear: Tympanic membrane, ear canal and external ear normal.      Nose: Nose normal. No septal deviation.      Right Sinus: No maxillary sinus tenderness or frontal sinus tenderness.      Left Sinus: No maxillary sinus tenderness or frontal sinus tenderness.      Mouth/Throat:      Lips: Pink. No lesions.      Mouth: Mucous membranes are moist. No oral lesions.      Dentition: Abnormal dentition. Dental caries present.      Tongue: No lesions.      Palate: No mass and lesions.     "  Pharynx: Oropharynx is clear. Uvula midline.      Tonsils: No tonsillar exudate.      Comments: Mostly edentulous with some decayed tooth roots present  Eyes:      Extraocular Movements: Extraocular movements intact.      Conjunctiva/sclera: Conjunctivae normal.      Pupils: Pupils are equal, round, and reactive to light.   Neck:      Thyroid: No thyroid mass, thyromegaly or thyroid tenderness.      Trachea: Trachea normal.   Pulmonary:      Effort: Pulmonary effort is normal. No respiratory distress.   Musculoskeletal:         General: Normal range of motion.      Cervical back: Normal range of motion and neck supple. No tenderness.   Lymphadenopathy:      Cervical: No cervical adenopathy.   Skin:     General: Skin is warm and dry.   Neurological:      General: No focal deficit present.      Mental Status: She is alert and oriented to person, place, and time.      Cranial Nerves: No cranial nerve deficit.      Motor: No weakness.      Gait: Gait normal.   Psychiatric:         Mood and Affect: Mood normal.         Behavior: Behavior normal.         Thought Content: Thought content normal.         Judgment: Judgment normal.               Result Review :              Assessment and Plan    Diagnoses and all orders for this visit:    1. Sensorineural hearing loss (SNHL) of both ears (Primary)  -     Audiometry With Tympanometry; Future    2. Tinnitus of both ears  -     Audiometry With Tympanometry; Future    3. Ear pressure, bilateral  -     Audiometry With Tympanometry; Future    4. TMJ inflammation    On examination today bilateral external auditory canals and bilateral tympanic membrane appearance is normal.  There are no perforations and middle ears are well aerated.  She is having tenderness to palpation of bilateral temporomandibular joints.  I did obtain an audiogram and tympanogram.  The audiogram shows the right ear with normal hearing sloping to a mild sensorineural hearing loss from 2000 Hz to 8000 Hz.   The left ear has normal hearing sloping to a mild sensorineural hearing loss from 4000 Hz through 8000 Hz.  Speech reception thresholds at 20 dB bilaterally.  Word discrimination scores 100% in the right ear at 60 dB and 96% in the left ear at 60 dB.  Tympanograms were normal bilaterally.  I have gone over the results of the audiogram with the patient and given her copy.  She does not have any signs of middle ear fluid or infection today.  She does have some high-frequency sensorineural hearing loss we have discussed the link between tinnitus and this.  Her pressure symptoms could be coming from her TMJ inflammation.  I have advised her to use warm compresses, massage and soft food diet.  I would like to see her back in 1 year for repeat audiogram.       Follow Up   No follow-ups on file.  Patient was given instructions and counseling regarding her condition or for health maintenance advice. Please see specific information pulled into the AVS if appropriate.      MONICA Whitaker

## 2022-08-31 ENCOUNTER — LAB (OUTPATIENT)
Dept: ONCOLOGY | Facility: HOSPITAL | Age: 57
End: 2022-08-31

## 2022-08-31 ENCOUNTER — CONSULT (OUTPATIENT)
Dept: ONCOLOGY | Facility: HOSPITAL | Age: 57
End: 2022-08-31

## 2022-08-31 VITALS
TEMPERATURE: 98.1 F | WEIGHT: 207.45 LBS | BODY MASS INDEX: 39.2 KG/M2 | SYSTOLIC BLOOD PRESSURE: 162 MMHG | RESPIRATION RATE: 18 BRPM | DIASTOLIC BLOOD PRESSURE: 99 MMHG | OXYGEN SATURATION: 96 % | HEART RATE: 109 BPM

## 2022-08-31 DIAGNOSIS — F17.219 CIGARETTE NICOTINE DEPENDENCE WITH NICOTINE-INDUCED DISORDER: ICD-10-CM

## 2022-08-31 DIAGNOSIS — D72.829 LEUKOCYTOSIS, UNSPECIFIED TYPE: Primary | ICD-10-CM

## 2022-08-31 PROBLEM — M25.559 GREATER TROCHANTERIC PAIN SYNDROME: Status: ACTIVE | Noted: 2022-08-31

## 2022-08-31 PROBLEM — K20.0 EOSINOPHILIC ESOPHAGITIS: Status: ACTIVE | Noted: 2020-09-02

## 2022-08-31 PROBLEM — R93.89 ABNORMAL MRI: Status: ACTIVE | Noted: 2018-09-12

## 2022-08-31 PROBLEM — K21.9 GASTROESOPHAGEAL REFLUX DISEASE: Status: ACTIVE | Noted: 2020-02-26

## 2022-08-31 PROBLEM — E78.5 HYPERLIPEMIA: Status: ACTIVE | Noted: 2022-08-31

## 2022-08-31 PROBLEM — R91.8 MULTIPLE NODULES OF LUNG: Status: ACTIVE | Noted: 2018-04-18

## 2022-08-31 PROBLEM — R25.2 MUSCLE CRAMPS: Status: ACTIVE | Noted: 2022-08-31

## 2022-08-31 PROBLEM — G43.909 MIGRAINE HEADACHE: Status: ACTIVE | Noted: 2018-04-03

## 2022-08-31 PROBLEM — R00.1 SINUS BRADYCARDIA: Status: ACTIVE | Noted: 2022-08-31

## 2022-08-31 PROBLEM — M79.7 FIBROMYALGIA: Status: ACTIVE | Noted: 2019-02-08

## 2022-08-31 PROBLEM — D49.0 INTRADUCTAL PAPILLARY MUCINOUS NEOPLASM OF PANCREAS: Status: ACTIVE | Noted: 2020-07-02

## 2022-08-31 PROBLEM — H93.13 BILATERAL TINNITUS: Status: ACTIVE | Noted: 2020-10-16

## 2022-08-31 PROBLEM — N31.9 NEUROGENIC BLADDER: Status: ACTIVE | Noted: 2022-08-31

## 2022-08-31 PROBLEM — N39.0 CHRONIC URINARY TRACT INFECTION: Status: ACTIVE | Noted: 2020-02-26

## 2022-08-31 PROBLEM — E87.6 HYPOKALEMIA: Status: ACTIVE | Noted: 2020-12-28

## 2022-08-31 PROBLEM — E78.5 HYPERLIPIDEMIA: Status: ACTIVE | Noted: 2022-08-31

## 2022-08-31 PROBLEM — N32.9 BLADDER DISORDER: Status: ACTIVE | Noted: 2022-08-31

## 2022-08-31 PROBLEM — R01.1 HEART MURMUR: Status: ACTIVE | Noted: 2020-03-24

## 2022-08-31 PROBLEM — I51.9 HEART DISEASE: Status: ACTIVE | Noted: 2022-08-31

## 2022-08-31 PROBLEM — G89.29 CHRONIC PAIN: Status: ACTIVE | Noted: 2019-02-08

## 2022-08-31 PROBLEM — M79.89 LIMB SWELLING: Status: ACTIVE | Noted: 2022-08-31

## 2022-08-31 PROBLEM — M47.817 LUMBOSACRAL SPONDYLOSIS WITHOUT MYELOPATHY: Status: ACTIVE | Noted: 2022-08-31

## 2022-08-31 PROBLEM — Z78.9 SELF-CATHETERIZES URINARY BLADDER: Status: ACTIVE | Noted: 2022-08-31

## 2022-08-31 PROBLEM — I25.10 CORONARY ARTERY DISEASE: Status: ACTIVE | Noted: 2017-11-01

## 2022-08-31 PROBLEM — J30.2 SEASONAL ALLERGIC RHINITIS: Status: ACTIVE | Noted: 2017-11-01

## 2022-08-31 PROBLEM — K82.8 BILIARY DYSKINESIA: Status: ACTIVE | Noted: 2020-08-01

## 2022-08-31 PROBLEM — K44.9 HIATAL HERNIA: Status: ACTIVE | Noted: 2020-08-01

## 2022-08-31 PROBLEM — H55.00 NYSTAGMUS: Status: ACTIVE | Noted: 2020-10-16

## 2022-08-31 PROBLEM — R07.89 ATYPICAL CHEST PAIN: Status: ACTIVE | Noted: 2020-12-28

## 2022-08-31 PROBLEM — I10 ESSENTIAL HYPERTENSION: Status: ACTIVE | Noted: 2017-03-06

## 2022-08-31 PROBLEM — M79.606 LEG PAIN: Status: ACTIVE | Noted: 2022-08-31

## 2022-08-31 PROBLEM — G56.00 CARPAL TUNNEL SYNDROME: Status: ACTIVE | Noted: 2020-11-24

## 2022-08-31 PROBLEM — K21.00 REFLUX ESOPHAGITIS: Status: ACTIVE | Noted: 2022-08-31

## 2022-08-31 PROBLEM — M19.90 ARTHRITIS: Status: ACTIVE | Noted: 2022-08-31

## 2022-08-31 PROBLEM — F32.A DEPRESSIVE DISORDER: Status: ACTIVE | Noted: 2017-06-27

## 2022-08-31 PROBLEM — M79.609 LIMB PAIN: Status: ACTIVE | Noted: 2022-08-31

## 2022-08-31 PROBLEM — Z86.19 HISTORY OF ROCKY MOUNTAIN SPOTTED FEVER: Status: ACTIVE | Noted: 2017-05-16

## 2022-08-31 PROBLEM — R42 VERTIGO: Status: ACTIVE | Noted: 2020-10-16

## 2022-08-31 PROBLEM — K76.0 NONALCOHOLIC FATTY LIVER DISEASE: Status: ACTIVE | Noted: 2020-06-17

## 2022-08-31 PROBLEM — R52 PAIN DISORDER: Status: ACTIVE | Noted: 2017-05-16

## 2022-08-31 PROBLEM — E55.9 VITAMIN D DEFICIENCY: Status: ACTIVE | Noted: 2019-02-12

## 2022-08-31 PROBLEM — I10 HIGH BLOOD PRESSURE: Status: ACTIVE | Noted: 2022-08-31

## 2022-08-31 PROBLEM — F17.200 NICOTINE DEPENDENCE: Status: ACTIVE | Noted: 2020-02-26

## 2022-08-31 PROBLEM — K20.90 BARRETT'S ESOPHAGUS WITH ESOPHAGITIS: Status: ACTIVE | Noted: 2020-08-01

## 2022-08-31 PROBLEM — R33.9 CHRONIC RETENTION OF URINE: Status: ACTIVE | Noted: 2020-04-22

## 2022-08-31 PROBLEM — G43.709 CHRONIC MIGRAINE WITHOUT AURA WITHOUT STATUS MIGRAINOSUS, NOT INTRACTABLE: Status: ACTIVE | Noted: 2020-01-02

## 2022-08-31 PROBLEM — M16.9 OSTEOARTHRITIS OF HIP: Status: ACTIVE | Noted: 2022-08-31

## 2022-08-31 PROBLEM — K22.70 BARRETT'S ESOPHAGUS WITH ESOPHAGITIS: Status: ACTIVE | Noted: 2020-08-01

## 2022-08-31 PROBLEM — R11.2 NAUSEA AND VOMITING: Status: ACTIVE | Noted: 2019-01-29

## 2022-08-31 PROCEDURE — 99214 OFFICE O/P EST MOD 30 MIN: CPT | Performed by: NURSE PRACTITIONER

## 2022-08-31 PROCEDURE — G0463 HOSPITAL OUTPT CLINIC VISIT: HCPCS | Performed by: NURSE PRACTITIONER

## 2022-08-31 NOTE — PROGRESS NOTES
Chief Complaint  Elevated White Blood Cell Count    Ynes Ceja, Ynes Carbajal, MONICA    Records Obtained:  Records of the patients history including those obtained from  [facility] and patient information were reviewed and summarized in detail.    Reason for referral: leukocytosis    Subjective          Katrin Parham presents to [facility] for leukocytosis    History of Present Illness  Ms. Katrin Parham presents for new patient evaluation for leukocytosis for the last 3 months. Reports she is anticipating left knee surgery but the surgeon will not perform surgery until her WBC count is in the normal range. Patient is a smoker for many years. Reports currenting smoking a 1/4 PPD. She did quit for 9 years but restarted in 2013. She has multiple co-morbidities and is on several chronic medications. Reports she just had a colonoscopy and was told was normal. Reports she feels well other than ongoing fatigue. She has had weight loss of about 12 pounds. Denies any recent acute or chronic infections. Reports joint aches and pains. Also reports she tested negative for C-diff.     7/26/2022: WBC  14.9, Hemoglobin 12.7, platelet count 468 K   8/9/2022: WBC 16.7, hemoglobin 12.7, platelet count 477K      Oncology/Hematology History    No history exists.       Review of Systems   Constitutional: Positive for fatigue.   Gastrointestinal: Positive for diarrhea and nausea.   Musculoskeletal: Positive for arthralgias, back pain and gait problem.   Neurological: Positive for dizziness and headache.   All other systems reviewed and are negative.      Current Outpatient Medications on File Prior to Visit   Medication Sig Dispense Refill   • Accu-Chek FastClix Lancets misc Use as directed to monitor blood glucose THREE TIMES DAILY and AS NEEDED     • Accu-Chek Guide test strip Use as directed to monitor blood glucose THREE TIMES DAILY and AS NEEDED     • albuterol sulfate   (90 Base) MCG/ACT inhaler INHALE 2 PUFFS BY MOUTH EVERY 4 HOURS SHAKE WELL     • amitriptyline (ELAVIL) 25 MG tablet Take 25 mg by mouth Daily.     • amLODIPine (NORVASC) 5 MG tablet TAKE 1 TABLET BY MOUTH EVERY DAY WITH VALSARTAN AND HCTZ     • aspirin 81 MG EC tablet aspirin 81 mg tablet,delayed release     • butalbital-acetaminophen-caffeine (FIORICET, ESGIC) -40 MG per tablet butalbital-acetaminophen-caff -40 mg oral tablet take 1 - 2 tablets by oral route every 4 hours as needed not to exceed 6 tablets per 24hrs   Suspended     • Cholecalciferol (Vitamin D3) 1.25 MG (09193 UT) capsule Take 50,000 Units by mouth 1 (One) Time Per Week.     • desvenlafaxine (PRISTIQ) 50 MG 24 hr tablet Take 50 mg by mouth Daily.     • Erenumab-aooe (AIMOVIG) 70 MG/ML prefilled syringe 1 mL.     • famotidine (PEPCID) 20 MG tablet TAKE 1 TABLET BY MOUTH TWICE DAILY FOR 14 DAYS     • fexofenadine (ALLEGRA) 180 MG tablet fexofenadine 180 mg tablet   TAKE 1 TABLET BY MOUTH EVERY DAY     • fluconazole (DIFLUCAN) 150 MG tablet TAKE 1 TABLET BY MOUTH NOW, REPEAT DOSE IN 4 DAYS     • gabapentin (NEURONTIN) 600 MG tablet Take 1,200 mg by mouth 3 (Three) Times a Day.     • galcanezumab-gnlm (Emgality) 120 MG/ML auto-injector pen 120 mg.     • hydroCHLOROthiazide (HYDRODIURIL) 12.5 MG tablet TAKE 1 TABLET BY MOUTH EVERY DAY ALONG WITH AMLODIPINE AND VALSARTAN     • Hyoscyamine Sulfate SL 0.125 MG sublingual tablet hyoscyamine 0.125 mg sublingual tablet   PLACE 1 TABLET UNDER THE TONGUE EVERY 4 HOURS AS NEEDED     • Lactobacillus (Acidophilus/L-Sporogenes) tablet Take 1 tablet by mouth Every Morning.     • lidocaine 1% - EPINEPHrine 1:877512 (XYLOCAINE W/EPI) 1 %-1:111272 injection 1 mL.     • linaclotide (LINZESS) 145 MCG capsule capsule Linzess 145 mcg capsule   TAKE 1 CAPSULE BY MOUTH EVERY DAY     • meloxicam (MOBIC) 15 MG tablet Take 15 mg by mouth Daily. with food     • metFORMIN (GLUCOPHAGE) 500 MG tablet Take 500 mg by  mouth Daily.     • montelukast (SINGULAIR) 10 MG tablet Take 10 mg by mouth Every Morning.     • nitrofurantoin (MACRODANTIN) 50 MG capsule Take 50 mg by mouth every night at bedtime.     • Nurtec 75 MG dispersible tablet Take 1 tablet every other day and as needed daily for migraine prevention     • oxyCODONE (ROXICODONE) 10 MG tablet TAKE 1 TABLET BY MOUTH EVERY 6 HOURS AS NEEDED FOR PAIN MAY CAUSE DROWSINESS     • pantoprazole (PROTONIX) 40 MG EC tablet Take 40 mg by mouth 2 (Two) Times a Day.     • promethazine-dextromethorphan (PROMETHAZINE-DM) 6.25-15 MG/5ML syrup TAKE 5ML BY MOUTH EVERY 4 TO 6 HOURS MAY CAUSE DROWSINESS     • rosuvastatin (CRESTOR) 10 MG tablet Take 10 mg by mouth Daily.     • tiZANidine (ZANAFLEX) 4 MG tablet TAKE 1 TO 2 TABLETS BY MOUTH THREE TIMES DAILY AS NEEDED MAY CAUSE DROWSINESS     • valsartan (DIOVAN) 160 MG tablet TAKE 1 TABLET BY MOUTH EVERY DAY along with amlodipine and hctz       No current facility-administered medications on file prior to visit.       Allergies   Allergen Reactions   • Erythromycin Other (See Comments)     Lockjaw     • Hydrocodone-Acetaminophen Nausea And Vomiting   • Lisinopril Itching and Other (See Comments)     Shaky, jerking     • Shellfish-Derived Products Other (See Comments)     Per allergy testing   • Codeine Itching, Nausea Only, Nausea And Vomiting, Rash and GI Intolerance   • Paroxetine Itching, Nausea And Vomiting and Rash     Past Medical History:   Diagnosis Date   • Arthritis    • Diabetes (HCC)    • High blood pressure    • High cholesterol    • Vertigo      Past Surgical History:   Procedure Laterality Date   • EYE SURGERY     • HAND SURGERY N/A     2008,2020,2021   • HYSTERECTOMY      partial 2001     Social History     Socioeconomic History   • Marital status:    Tobacco Use   • Smoking status: Current Every Day Smoker     Packs/day: 0.50     Years: 10.00     Pack years: 5.00     Types: Cigarettes   • Smokeless tobacco: Never Used    Vaping Use   • Vaping Use: Never used   Substance and Sexual Activity   • Drug use: Defer   • Sexual activity: Defer     Family History   Problem Relation Age of Onset   • Cancer Mother    • Hypertension Father    • Diabetes Father    • Heart disease Father    • Hypertension Brother    • Diabetes Brother    • Cancer Brother      Immunization History   Administered Date(s) Administered   • COVID-19 (PFIZER) PURPLE CAP 03/20/2021, 04/10/2021, 11/10/2021   • COVID-19 (UNSPECIFIED) 03/20/2021, 04/10/2021   • FluLaval/Fluzone >6mos 10/02/2020, 10/27/2021   • Fluzone Quad >6mos (Multi-dose) 11/28/2017   • Tdap 09/28/2020   • flucelvax quad pfs =>4 YRS 10/26/2018       Objective   Physical Exam  Vitals and nursing note reviewed.   Constitutional:       Appearance: Normal appearance. She is obese.   HENT:      Head: Normocephalic.      Nose: Nose normal.      Mouth/Throat:      Mouth: Mucous membranes are moist.   Eyes:      Pupils: Pupils are equal, round, and reactive to light.   Cardiovascular:      Rate and Rhythm: Normal rate and regular rhythm.      Pulses: Normal pulses.      Heart sounds: Normal heart sounds. No murmur heard.  Pulmonary:      Effort: Pulmonary effort is normal. No respiratory distress.      Breath sounds: Normal breath sounds.   Abdominal:      General: Bowel sounds are normal.      Palpations: Abdomen is soft.   Musculoskeletal:         General: Normal range of motion.      Cervical back: Normal range of motion and neck supple.   Skin:     General: Skin is warm and dry.      Capillary Refill: Capillary refill takes less than 2 seconds.   Neurological:      General: No focal deficit present.      Mental Status: She is alert and oriented to person, place, and time.   Psychiatric:         Mood and Affect: Mood normal.         Behavior: Behavior normal.         Thought Content: Thought content normal.         Judgment: Judgment normal.         Vitals:    08/31/22 1050   BP: 162/99   Pulse: 109    Resp: 18   Temp: 98.1 °F (36.7 °C)   SpO2: 96%   Weight: 94.1 kg (207 lb 7.3 oz)   PainSc:   7   PainLoc: Generalized     ECOG score: 1         ECOG: (0) Fully Active - Able to Carry On All Pre-disease Performance Without Restriction  Fall Risk Assessment was completed, and patient is at moderate risk for falls.  PHQ-9 Total Score: 0       The patient is  experiencing fatigue. Fatigue score: 5    PT/OT Functional Screening: PT fx screen: No needs identified  Speech Functional Screening: Speech fx screen: No needs identified  Rehab to be ordered: Rehab to be ordered: No needs identified        Result Review :   The following data was reviewed by: MONICA Brown on 08/31/2022:  Lab Results   Component Value Date    HGB 12.1 11/14/2020    HCT 38.5 11/14/2020    MCV 89.1 11/14/2020     11/14/2020    WBC 9.60 11/14/2020    NEUTROABS 6.29 11/14/2020    LYMPHSABS 2.26 11/14/2020    MONOSABS 0.82 11/14/2020    EOSABS 0.15 11/14/2020    BASOSABS 0.04 11/14/2020     Lab Results   Component Value Date    GLUCOSE 104 (H) 11/15/2020    BUN 13 11/15/2020    CREATININE 0.99 (H) 11/15/2020     11/15/2020    K 3.5 11/15/2020     11/15/2020    CO2 25 11/15/2020    CALCIUM 9.4 11/15/2020    PROTEINTOT 6.8 11/15/2020    ALBUMIN 3.8 11/15/2020    BILITOT 0.26 11/15/2020    ALKPHOS 81 11/15/2020    AST 16 11/15/2020    ALT 16 11/15/2020          Assessment and Plan    Diagnoses and all orders for this visit:    1. Leukocytosis, unspecified type (Primary)  -     CBC & Differential; Future  -     Peripheral Blood Smear; Future  -     Sedimentation Rate; Future  -     Hepatitis Panel, Acute; Future  -     HIV-1 & HIV-2 Antibodies; Future  -     JAK2 Mutation Analysis, Qual; Future  -     Rheumatoid Factor, Quant; Future  -     SHANNAN; Future  -     Comprehensive Metabolic Panel; Future    2. Cigarette nicotine dependence with nicotine-induced disorder    new patient evaluation for leukocytosis. Appears to have  elevated WBC count now for the last couple of months and mild thrombocytosis as well. WBC cound has been in the 14 - 16 range and the platelet count in the upper 400 range. She is a tobacco user and is actively trying to cut back. She is awaiting left knee replacement and she was told she could not have surgery until her WBC is normalized. Chronic tobacco use can cause leukocytosis as well she has several co-morbidities which may cause chronic inflammation. The differential shows mostly neutrophils. Denies any fevers, chills, or recent or chronic infections. She tested negative for C diff she reports.     Will check sed rate to see if she has underlying inflammation, check for any Keagan 2 mutations, HIV, Hepatitis, as well as other auto immune markers including SHANNAN and RF. She reports bilateral pain in several joints.     In the meantime, we discussed she needs to quit smoking as it will take time for the WBC count to normalize if this is part of the etiology for leukocytosis.       I spent 25 minutes caring for Katrin on this date of service. This time includes time spent by me in the following activities:preparing for the visit, reviewing tests, obtaining and/or reviewing a separately obtained history, performing a medically appropriate examination and/or evaluation , counseling and educating the patient/family/caregiver, ordering medications, tests, or procedures, referring and communicating with other health care professionals , documenting information in the medical record, independently interpreting results and communicating that information with the patient/family/caregiver and care coordination    Patient Follow Up: 1 month with MD.     Patient was given instructions and counseling regarding her condition or for health maintenance advice. Please see specific information pulled into the AVS if appropriate.     Khushbu Miller, APRN    8/31/2022

## 2022-09-06 ENCOUNTER — LAB (OUTPATIENT)
Dept: ONCOLOGY | Facility: HOSPITAL | Age: 57
End: 2022-09-06

## 2022-09-06 DIAGNOSIS — D72.829 LEUKOCYTOSIS, UNSPECIFIED TYPE: ICD-10-CM

## 2022-09-06 LAB
ALBUMIN SERPL-MCNC: 4.3 G/DL (ref 3.5–5.2)
ALBUMIN/GLOB SERPL: 1.7 G/DL
ALP SERPL-CCNC: 93 U/L (ref 39–117)
ALT SERPL W P-5'-P-CCNC: 16 U/L (ref 1–33)
ANION GAP SERPL CALCULATED.3IONS-SCNC: 15.9 MMOL/L (ref 5–15)
AST SERPL-CCNC: 11 U/L (ref 1–32)
BASOPHILS # BLD AUTO: 0.04 10*3/MM3 (ref 0–0.2)
BASOPHILS NFR BLD AUTO: 0.3 % (ref 0–1.5)
BILIRUB SERPL-MCNC: 0.3 MG/DL (ref 0–1.2)
BUN SERPL-MCNC: 11 MG/DL (ref 6–20)
BUN/CREAT SERPL: 13.1 (ref 7–25)
CALCIUM SPEC-SCNC: 9.4 MG/DL (ref 8.6–10.5)
CHLORIDE SERPL-SCNC: 96 MMOL/L (ref 98–107)
CHROMATIN AB SERPL-ACNC: 20.2 IU/ML (ref 0–14)
CO2 SERPL-SCNC: 24.1 MMOL/L (ref 22–29)
CREAT SERPL-MCNC: 0.84 MG/DL (ref 0.57–1)
DEPRECATED RDW RBC AUTO: 48.4 FL (ref 37–54)
EGFRCR SERPLBLD CKD-EPI 2021: 81.7 ML/MIN/1.73
EOSINOPHIL # BLD AUTO: 0.07 10*3/MM3 (ref 0–0.4)
EOSINOPHIL NFR BLD AUTO: 0.5 % (ref 0.3–6.2)
ERYTHROCYTE [DISTWIDTH] IN BLOOD BY AUTOMATED COUNT: 15 % (ref 12.3–15.4)
ERYTHROCYTE [SEDIMENTATION RATE] IN BLOOD: 40 MM/HR (ref 0–30)
GLOBULIN UR ELPH-MCNC: 2.6 GM/DL
GLUCOSE SERPL-MCNC: 124 MG/DL (ref 65–99)
HAV IGM SERPL QL IA: NORMAL
HBV CORE IGM SERPL QL IA: NORMAL
HBV SURFACE AG SERPL QL IA: NORMAL
HCT VFR BLD AUTO: 38.6 % (ref 34–46.6)
HCV AB SER DONR QL: NORMAL
HGB BLD-MCNC: 11.9 G/DL (ref 12–15.9)
HIV1+2 AB SER QL: NORMAL
IMM GRANULOCYTES # BLD AUTO: 0.06 10*3/MM3 (ref 0–0.05)
IMM GRANULOCYTES NFR BLD AUTO: 0.4 % (ref 0–0.5)
LYMPHOCYTES # BLD AUTO: 2.77 10*3/MM3 (ref 0.7–3.1)
LYMPHOCYTES # BLD MANUAL: 2.92 10*3/MM3 (ref 0.7–3.1)
LYMPHOCYTES NFR BLD AUTO: 18 % (ref 19.6–45.3)
LYMPHOCYTES NFR BLD MANUAL: 8 % (ref 5–12)
MCH RBC QN AUTO: 27 PG (ref 26.6–33)
MCHC RBC AUTO-ENTMCNC: 30.8 G/DL (ref 31.5–35.7)
MCV RBC AUTO: 87.5 FL (ref 79–97)
MONOCYTES # BLD AUTO: 0.76 10*3/MM3 (ref 0.1–0.9)
MONOCYTES # BLD: 1.23 10*3/MM3 (ref 0.1–0.9)
MONOCYTES NFR BLD AUTO: 4.9 % (ref 5–12)
NEUTROPHILS # BLD AUTO: 11.23 10*3/MM3 (ref 1.7–7)
NEUTROPHILS NFR BLD AUTO: 11.69 10*3/MM3 (ref 1.7–7)
NEUTROPHILS NFR BLD AUTO: 75.9 % (ref 42.7–76)
NEUTROPHILS NFR BLD MANUAL: 73 % (ref 42.7–76)
PATHOLOGY REVIEW: YES
PLAT MORPH BLD: NORMAL
PLATELET # BLD AUTO: 392 10*3/MM3 (ref 140–450)
PMV BLD AUTO: 8.9 FL (ref 6–12)
POTASSIUM SERPL-SCNC: 3.3 MMOL/L (ref 3.5–5.2)
PROT SERPL-MCNC: 6.9 G/DL (ref 6–8.5)
RBC # BLD AUTO: 4.41 10*6/MM3 (ref 3.77–5.28)
RBC MORPH BLD: NORMAL
SODIUM SERPL-SCNC: 136 MMOL/L (ref 136–145)
VARIANT LYMPHS NFR BLD MANUAL: 19 % (ref 19.6–45.3)
WBC MORPH BLD: NORMAL
WBC NRBC COR # BLD: 15.39 10*3/MM3 (ref 3.4–10.8)

## 2022-09-06 PROCEDURE — 81270 JAK2 GENE: CPT

## 2022-09-06 PROCEDURE — 85025 COMPLETE CBC W/AUTO DIFF WBC: CPT

## 2022-09-06 PROCEDURE — G0432 EIA HIV-1/HIV-2 SCREEN: HCPCS

## 2022-09-06 PROCEDURE — 86038 ANTINUCLEAR ANTIBODIES: CPT

## 2022-09-06 PROCEDURE — 80053 COMPREHEN METABOLIC PANEL: CPT

## 2022-09-06 PROCEDURE — 85652 RBC SED RATE AUTOMATED: CPT

## 2022-09-06 PROCEDURE — 80074 ACUTE HEPATITIS PANEL: CPT

## 2022-09-06 PROCEDURE — 86431 RHEUMATOID FACTOR QUANT: CPT

## 2022-09-06 PROCEDURE — 86225 DNA ANTIBODY NATIVE: CPT

## 2022-09-06 PROCEDURE — 36415 COLL VENOUS BLD VENIPUNCTURE: CPT

## 2022-09-08 LAB
DSDNA IGG SERPL IA-ACNC: NEGATIVE [IU]/ML
NUCLEAR IGG SER IA-RTO: NEGATIVE

## 2022-09-12 LAB
JAK2 P.V617F BLD/T QL: NORMAL
LAB DIRECTOR NAME PROVIDER: NORMAL
LABORATORY COMMENT REPORT: NORMAL

## 2022-09-14 LAB
CYTO UR: NORMAL
LAB AP CASE REPORT: NORMAL
LAB AP CLINICAL INFORMATION: NORMAL
PATH REPORT.FINAL DX SPEC: NORMAL
PATH REPORT.GROSS SPEC: NORMAL

## 2022-09-30 ENCOUNTER — OFFICE VISIT (OUTPATIENT)
Dept: ONCOLOGY | Facility: HOSPITAL | Age: 57
End: 2022-09-30

## 2022-09-30 VITALS
BODY MASS INDEX: 38.24 KG/M2 | TEMPERATURE: 96.9 F | SYSTOLIC BLOOD PRESSURE: 108 MMHG | RESPIRATION RATE: 18 BRPM | WEIGHT: 202.38 LBS | HEART RATE: 90 BPM | DIASTOLIC BLOOD PRESSURE: 61 MMHG | OXYGEN SATURATION: 98 %

## 2022-09-30 DIAGNOSIS — D72.829 LEUKOCYTOSIS, UNSPECIFIED TYPE: Primary | ICD-10-CM

## 2022-09-30 PROBLEM — R19.7 DIARRHEA: Status: ACTIVE | Noted: 2022-08-16

## 2022-09-30 PROBLEM — S80.02XA CONTUSION OF LEFT KNEE: Status: ACTIVE | Noted: 2022-04-06

## 2022-09-30 PROBLEM — M25.562 LEFT KNEE PAIN: Status: ACTIVE | Noted: 2022-05-03

## 2022-09-30 PROCEDURE — G0463 HOSPITAL OUTPT CLINIC VISIT: HCPCS

## 2022-09-30 PROCEDURE — 99213 OFFICE O/P EST LOW 20 MIN: CPT | Performed by: INTERNAL MEDICINE

## 2022-09-30 RX ORDER — ERGOCALCIFEROL 1.25 MG/1
50000 CAPSULE ORAL WEEKLY
COMMUNITY
Start: 2022-09-10

## 2022-09-30 RX ORDER — DIMENHYDRINATE 50 MG
1 TABLET ORAL DAILY
COMMUNITY
Start: 2022-09-07

## 2022-09-30 NOTE — PROGRESS NOTES
Chief Complaint  No chief complaint on file.    Ynes Ceja, Ynes Carbajal, APRN    Records Obtained:  Records of the patients history including those obtained from  [facility] and patient information were reviewed and summarized in detail.    Reason for referral: leukocytosis    Subjective          Katrin Parham presents to [facility] HEMATOLOGY & ONCOLOGY for leukocytosis    History of Present Illness  Ms. Katrin Parham presents for follow up for leukocytosis for the last 3 months. Patient is a smoker for many years. Reports currenting smoking a 1/4 PPD. She did quit for 9 years but restarted in 2013. She has multiple co-morbidities including multiple sclerosis and is on several chronic medications. She has had increasing pain in her bones recently. Has ongoing fatigue. She has had weight loss of about 12 pounds. Denies any recent acute or chronic infections.  No fevers.       Hematologic History  7/26/2022: WBC  14.9, Hemoglobin 12.7, platelet count 468 K     8/9/2022: WBC 16.7, hemoglobin 12.7, platelet count 477K    9/6/22: CBC with WBC of 15.39, incr in absolute PMNs, Hgb 11.9, MCV 87.5, plt 392K. JAK2 negative. Smear with absolute neutrophilia, normal morphology. RF positive, sed rate elevated. SHANNAN negative.         Oncology/Hematology History    No history exists.       Review of Systems   Constitutional: Positive for fatigue.   Musculoskeletal: Positive for arthralgias, back pain and gait problem.   Neurological: Positive for dizziness and headache.   All other systems reviewed and are negative.      Current Outpatient Medications on File Prior to Visit   Medication Sig Dispense Refill   • Accu-Chek FastClix Lancets misc Use as directed to monitor blood glucose THREE TIMES DAILY and AS NEEDED     • Accu-Chek Guide test strip Use as directed to monitor blood glucose THREE TIMES DAILY and AS NEEDED     • albuterol sulfate  (90 Base) MCG/ACT inhaler INHALE 2 PUFFS BY  MOUTH EVERY 4 HOURS SHAKE WELL     • amitriptyline (ELAVIL) 25 MG tablet Take 25 mg by mouth Daily.     • amLODIPine (NORVASC) 5 MG tablet TAKE 1 TABLET BY MOUTH EVERY DAY WITH VALSARTAN AND HCTZ     • aspirin 81 MG EC tablet aspirin 81 mg tablet,delayed release     • butalbital-acetaminophen-caffeine (FIORICET, ESGIC) -40 MG per tablet butalbital-acetaminophen-caff -40 mg oral tablet take 1 - 2 tablets by oral route every 4 hours as needed not to exceed 6 tablets per 24hrs   Suspended     • Cholecalciferol (Vitamin D3) 1.25 MG (57196 UT) capsule Take 50,000 Units by mouth 1 (One) Time Per Week.     • Coenzyme Q10 (Co Q-10) 100 MG capsule Take 1 capsule by mouth Daily.     • desvenlafaxine (PRISTIQ) 50 MG 24 hr tablet Take 50 mg by mouth Daily.     • famotidine (PEPCID) 20 MG tablet TAKE 1 TABLET BY MOUTH TWICE DAILY FOR 14 DAYS     • fexofenadine (ALLEGRA) 180 MG tablet fexofenadine 180 mg tablet   TAKE 1 TABLET BY MOUTH EVERY DAY     • gabapentin (NEURONTIN) 600 MG tablet Take 1,200 mg by mouth 3 (Three) Times a Day.     • hydroCHLOROthiazide (HYDRODIURIL) 12.5 MG tablet TAKE 1 TABLET BY MOUTH EVERY DAY ALONG WITH AMLODIPINE AND VALSARTAN     • Hyoscyamine Sulfate SL 0.125 MG sublingual tablet hyoscyamine 0.125 mg sublingual tablet   PLACE 1 TABLET UNDER THE TONGUE EVERY 4 HOURS AS NEEDED     • Lactobacillus (Acidophilus/L-Sporogenes) tablet Take 1 tablet by mouth Every Morning.     • linaclotide (LINZESS) 145 MCG capsule capsule Linzess 145 mcg capsule   TAKE 1 CAPSULE BY MOUTH EVERY DAY     • meloxicam (MOBIC) 15 MG tablet Take 15 mg by mouth Daily. with food     • metFORMIN (GLUCOPHAGE) 500 MG tablet Take 500 mg by mouth Daily.     • montelukast (SINGULAIR) 10 MG tablet Take 10 mg by mouth Every Morning.     • nitrofurantoin (MACRODANTIN) 50 MG capsule Take 50 mg by mouth every night at bedtime.     • Nurtec 75 MG dispersible tablet Take 1 tablet every other day and as needed daily for migraine  prevention     • oxyCODONE (ROXICODONE) 10 MG tablet TAKE 1 TABLET BY MOUTH EVERY 6 HOURS AS NEEDED FOR PAIN MAY CAUSE DROWSINESS     • pantoprazole (PROTONIX) 40 MG EC tablet Take 40 mg by mouth 2 (Two) Times a Day.     • promethazine-dextromethorphan (PROMETHAZINE-DM) 6.25-15 MG/5ML syrup TAKE 5ML BY MOUTH EVERY 4 TO 6 HOURS MAY CAUSE DROWSINESS     • rosuvastatin (CRESTOR) 10 MG tablet Take 10 mg by mouth Daily.     • tiZANidine (ZANAFLEX) 4 MG tablet TAKE 1 TO 2 TABLETS BY MOUTH THREE TIMES DAILY AS NEEDED MAY CAUSE DROWSINESS     • valsartan (DIOVAN) 160 MG tablet TAKE 1 TABLET BY MOUTH EVERY DAY along with amlodipine and hctz     • vitamin D (ERGOCALCIFEROL) 1.25 MG (38910 UT) capsule capsule Take 50,000 Units by mouth 1 (One) Time Per Week.     • [DISCONTINUED] Erenumab-aooe (AIMOVIG) 70 MG/ML prefilled syringe 1 mL.     • [DISCONTINUED] fluconazole (DIFLUCAN) 150 MG tablet TAKE 1 TABLET BY MOUTH NOW, REPEAT DOSE IN 4 DAYS     • [DISCONTINUED] galcanezumab-gnlm (Emgality) 120 MG/ML auto-injector pen 120 mg.     • [DISCONTINUED] lidocaine 1% - EPINEPHrine 1:090307 (XYLOCAINE W/EPI) 1 %-1:643858 injection 1 mL.       No current facility-administered medications on file prior to visit.       Allergies   Allergen Reactions   • Erythromycin Other (See Comments)     Lockjaw     • Hydrocodone-Acetaminophen Nausea And Vomiting   • Lisinopril Itching and Other (See Comments)     Shaky, jerking     • Shellfish-Derived Products Other (See Comments)     Per allergy testing   • Codeine Itching, Nausea Only, Nausea And Vomiting, Rash and GI Intolerance   • Paroxetine Itching, Nausea And Vomiting and Rash     Past Medical History:   Diagnosis Date   • Arthritis    • Diabetes (HCC)    • High blood pressure    • High cholesterol    • Vertigo      Past Surgical History:   Procedure Laterality Date   • EYE SURGERY     • HAND SURGERY N/A     2008,2020,2021   • HYSTERECTOMY      partial 2001     Social History     Socioeconomic  History   • Marital status:    Tobacco Use   • Smoking status: Current Every Day Smoker     Packs/day: 0.50     Years: 10.00     Pack years: 5.00     Types: Cigarettes   • Smokeless tobacco: Never Used   Vaping Use   • Vaping Use: Never used   Substance and Sexual Activity   • Drug use: Defer   • Sexual activity: Defer     Family History   Problem Relation Age of Onset   • Cancer Mother    • Hypertension Father    • Diabetes Father    • Heart disease Father    • Hypertension Brother    • Diabetes Brother    • Cancer Brother      Immunization History   Administered Date(s) Administered   • COVID-19 (PFIZER) PURPLE CAP 03/20/2021, 04/10/2021, 11/10/2021   • COVID-19 (UNSPECIFIED) 03/20/2021, 04/10/2021   • FluLaval/Fluzone >6mos 10/02/2020, 10/27/2021   • Fluzone Quad >6mos (Multi-dose) 11/28/2017   • Tdap 09/28/2020   • flucelvax quad pfs =>4 YRS 10/26/2018       Objective   Physical Exam  Vitals and nursing note reviewed.   Constitutional:       Appearance: Normal appearance. She is obese.   HENT:      Head: Normocephalic.      Nose: Nose normal.      Mouth/Throat:      Mouth: Mucous membranes are moist.   Cardiovascular:      Heart sounds: No murmur heard.  Pulmonary:      Effort: Pulmonary effort is normal. No respiratory distress.   Musculoskeletal:         General: Normal range of motion.      Cervical back: Normal range of motion and neck supple.   Neurological:      General: No focal deficit present.      Mental Status: She is alert and oriented to person, place, and time.   Psychiatric:         Mood and Affect: Mood normal.         Behavior: Behavior normal.         Thought Content: Thought content normal.         Judgment: Judgment normal.         Vitals:    09/30/22 0947   BP: 108/61   Pulse: 90   Resp: 18   Temp: 96.9 °F (36.1 °C)   SpO2: 98%   Weight: 91.8 kg (202 lb 6.1 oz)   PainSc:   8   PainLoc: Generalized               ECOG: (0) Fully Active - Able to Carry On All Pre-disease Performance  Without Restriction  Fall Risk Assessment was completed, and patient is at moderate risk for falls.  PHQ-9 Total Score:         The patient is  experiencing fatigue. Fatigue score: 5    PT/OT Functional Screening: PT fx screen: No needs identified  Speech Functional Screening: Speech fx screen: No needs identified  Rehab to be ordered: Rehab to be ordered: No needs identified        Result Review :   The following data was reviewed by: Patrick Howell MD on 08/31/2022:  Lab Results   Component Value Date    HGB 11.9 (L) 09/06/2022    HCT 38.6 09/06/2022    MCV 87.5 09/06/2022     09/06/2022    WBC 15.39 (H) 09/06/2022    NEUTROABS 11.69 (H) 09/06/2022    NEUTROABS 11.23 (H) 09/06/2022    LYMPHSABS 2.77 09/06/2022    MONOSABS 0.76 09/06/2022    EOSABS 0.07 09/06/2022    BASOSABS 0.04 09/06/2022     Lab Results   Component Value Date    GLUCOSE 124 (H) 09/06/2022    BUN 11 09/06/2022    CREATININE 0.84 09/06/2022     09/06/2022    K 3.3 (L) 09/06/2022    CL 96 (L) 09/06/2022    CO2 24.1 09/06/2022    CALCIUM 9.4 09/06/2022    PROTEINTOT 6.9 09/06/2022    ALBUMIN 4.30 09/06/2022    BILITOT 0.3 09/06/2022    ALKPHOS 93 09/06/2022    AST 11 09/06/2022    ALT 16 09/06/2022     Smear also reviewed: neutrophilia noted    PCP notes reviewed.      Assessment and Plan    Diagnoses and all orders for this visit:    1. Leukocytosis, unspecified type (Primary)    WBC cound has been in the 14 - 16 range. Platelets were elevated and now on upper side of normal. Neutrophilia noted on differential and smear. Smear with normal morphology. Sed rate increased as is RF. Patient's WBC count is likely reactive due to patient's medical conditions which include MS due to findings above including elevated sed rate and the neutrophilia. She is also a tobacco smoker which is a known cause of leukocytosis. There is no evidence of primary blood disorder or active infection at this time. It is unlikely patient's WBC count will  return to normal as it is likely elevated due to chronic inflammation/smoking. Reassurance provided to patient. She follows with MS physician in November. Smoking cessation recommended. Repeat CBC in 6 months.         I spent 25 minutes caring for Katrin on this date of service. This time includes time spent by me in the following activities:preparing for the visit, reviewing tests, obtaining and/or reviewing a separately obtained history, performing a medically appropriate examination and/or evaluation , counseling and educating the patient/family/caregiver, ordering medications, tests, or procedures, referring and communicating with other health care professionals , documenting information in the medical record, independently interpreting results and communicating that information with the patient/family/caregiver and care coordination    Patient Follow Up: 6 mos with repeat CBC prior.     Patient was given instructions and counseling regarding her condition or for health maintenance advice. Please see specific information pulled into the AVS if appropriate.   Patrick Howell MD    9/30/2022

## 2023-03-29 ENCOUNTER — TELEPHONE (OUTPATIENT)
Dept: ONCOLOGY | Facility: HOSPITAL | Age: 58
End: 2023-03-29
Payer: COMMERCIAL

## 2023-04-06 ENCOUNTER — TELEPHONE (OUTPATIENT)
Dept: ONCOLOGY | Facility: HOSPITAL | Age: 58
End: 2023-04-06
Payer: COMMERCIAL

## 2023-04-11 ENCOUNTER — TELEPHONE (OUTPATIENT)
Dept: ONCOLOGY | Facility: HOSPITAL | Age: 58
End: 2023-04-11
Payer: COMMERCIAL

## 2023-04-17 ENCOUNTER — OFFICE VISIT (OUTPATIENT)
Dept: ONCOLOGY | Facility: HOSPITAL | Age: 58
End: 2023-04-17
Payer: COMMERCIAL

## 2023-04-17 ENCOUNTER — LAB (OUTPATIENT)
Dept: ONCOLOGY | Facility: HOSPITAL | Age: 58
End: 2023-04-17
Payer: COMMERCIAL

## 2023-04-17 VITALS
DIASTOLIC BLOOD PRESSURE: 87 MMHG | TEMPERATURE: 98.4 F | OXYGEN SATURATION: 97 % | HEART RATE: 103 BPM | RESPIRATION RATE: 18 BRPM | SYSTOLIC BLOOD PRESSURE: 144 MMHG

## 2023-04-17 DIAGNOSIS — D72.829 LEUKOCYTOSIS, UNSPECIFIED TYPE: Primary | ICD-10-CM

## 2023-04-17 DIAGNOSIS — D72.829 LEUKOCYTOSIS, UNSPECIFIED TYPE: ICD-10-CM

## 2023-04-17 PROBLEM — J44.1 ACUTE EXACERBATION OF CHRONIC OBSTRUCTIVE AIRWAYS DISEASE: Status: ACTIVE | Noted: 2023-01-02

## 2023-04-17 LAB
BASOPHILS # BLD AUTO: 0.03 10*3/MM3 (ref 0–0.2)
BASOPHILS NFR BLD AUTO: 0.2 % (ref 0–1.5)
DEPRECATED RDW RBC AUTO: 47.8 FL (ref 37–54)
EOSINOPHIL # BLD AUTO: 0.13 10*3/MM3 (ref 0–0.4)
EOSINOPHIL NFR BLD AUTO: 1 % (ref 0.3–6.2)
ERYTHROCYTE [DISTWIDTH] IN BLOOD BY AUTOMATED COUNT: 14.5 % (ref 12.3–15.4)
HCT VFR BLD AUTO: 40.3 % (ref 34–46.6)
HGB BLD-MCNC: 12.8 G/DL (ref 12–15.9)
IMM GRANULOCYTES # BLD AUTO: 0.03 10*3/MM3 (ref 0–0.05)
IMM GRANULOCYTES NFR BLD AUTO: 0.2 % (ref 0–0.5)
LYMPHOCYTES # BLD AUTO: 2.44 10*3/MM3 (ref 0.7–3.1)
LYMPHOCYTES NFR BLD AUTO: 18.7 % (ref 19.6–45.3)
MCH RBC QN AUTO: 28.1 PG (ref 26.6–33)
MCHC RBC AUTO-ENTMCNC: 31.8 G/DL (ref 31.5–35.7)
MCV RBC AUTO: 88.4 FL (ref 79–97)
MONOCYTES # BLD AUTO: 0.83 10*3/MM3 (ref 0.1–0.9)
MONOCYTES NFR BLD AUTO: 6.4 % (ref 5–12)
NEUTROPHILS NFR BLD AUTO: 73.5 % (ref 42.7–76)
NEUTROPHILS NFR BLD AUTO: 9.6 10*3/MM3 (ref 1.7–7)
PLATELET # BLD AUTO: 401 10*3/MM3 (ref 140–450)
PMV BLD AUTO: 9.3 FL (ref 6–12)
RBC # BLD AUTO: 4.56 10*6/MM3 (ref 3.77–5.28)
WBC NRBC COR # BLD: 13.06 10*3/MM3 (ref 3.4–10.8)

## 2023-04-17 PROCEDURE — 36415 COLL VENOUS BLD VENIPUNCTURE: CPT

## 2023-04-17 PROCEDURE — G0463 HOSPITAL OUTPT CLINIC VISIT: HCPCS | Performed by: INTERNAL MEDICINE

## 2023-04-17 PROCEDURE — 85025 COMPLETE CBC W/AUTO DIFF WBC: CPT

## 2023-04-17 RX ORDER — TEMAZEPAM 30 MG/1
30 CAPSULE ORAL DAILY
COMMUNITY

## 2023-04-17 RX ORDER — HYDROMORPHONE HYDROCHLORIDE 4 MG/1
1 TABLET ORAL 3 TIMES DAILY
COMMUNITY
Start: 2023-04-08

## 2023-04-17 RX ORDER — PROPRANOLOL HYDROCHLORIDE 10 MG/1
10 TABLET ORAL DAILY
COMMUNITY

## 2023-04-17 RX ORDER — FLUOXETINE HYDROCHLORIDE 40 MG/1
40 CAPSULE ORAL DAILY
COMMUNITY

## 2023-04-17 RX ORDER — FLUCONAZOLE 150 MG/1
TABLET ORAL
COMMUNITY
Start: 2023-01-09

## 2023-04-17 RX ORDER — NEBULIZER AND COMPRESSOR
EACH MISCELLANEOUS SEE ADMIN INSTRUCTIONS
COMMUNITY
Start: 2023-01-04

## 2023-04-17 RX ORDER — NALOXONE HYDROCHLORIDE 4 MG/.1ML
SPRAY NASAL
COMMUNITY
Start: 2023-04-06

## 2023-04-17 NOTE — PROGRESS NOTES
Chief Complaint  Elevated white blood cell count    Ynes Ceja, Ynes Carbajal, MONICA    Records Obtained:  Records of the patients history including those obtained from  [facility] and patient information were reviewed and summarized in detail.    Reason for referral: leukocytosis    Subjective          Katrin Parham presents to [facility] HEMATOLOGY [facility] ONCOLOGY for leukocytosis    History of Present Illness  Ms. Katrin Parham presents for follow up for leukocytosis. Patient is a smoker for many years. Reports currenting smoking less than 1/4 PPD, which is better than previously. She has MS and has repeat brain MRI next month for this. She reports her chronic pain is worse. She has bilateral rib pain that is tender to touch. It is not red or swollen.  She has multiple co-morbidities including multiple sclerosis and is on several chronic medications. She has had increasing pain in her bones recently. Has ongoing fatigue.  No fevers reported.       Hematologic History  7/26/2022: WBC  14.9, Hemoglobin 12.7, platelet count 468 K     8/9/2022: WBC 16.7, hemoglobin 12.7, platelet count 477K    9/6/22: CBC with WBC of 15.39, incr in absolute PMNs, Hgb 11.9, MCV 87.5, plt 392K. JAK2 negative. Smear with absolute neutrophilia, normal morphology. RF positive, sed rate elevated. SHANNAN negative.     4/17/23: WBC 13.06, ANC increased at 9.6, hgb 12.8, plt 401      Oncology/Hematology History    No history exists.       Review of Systems   Constitutional: Positive for fatigue.   Musculoskeletal: Positive for arthralgias, back pain and gait problem.   Neurological: Negative for dizziness and headache.   All other systems reviewed and are negative.      Current Outpatient Medications on File Prior to Visit   Medication Sig Dispense Refill   • Accu-Chek FastClix Lancets misc Use as directed to monitor blood glucose THREE TIMES DAILY and AS NEEDED     • Accu-Chek Guide test strip Use as directed to  monitor blood glucose THREE TIMES DAILY and AS NEEDED     • albuterol sulfate  (90 Base) MCG/ACT inhaler INHALE 2 PUFFS BY MOUTH EVERY 4 HOURS SHAKE WELL     • amitriptyline (ELAVIL) 25 MG tablet Take 25 mg by mouth Daily.     • amLODIPine (NORVASC) 5 MG tablet TAKE 1 TABLET BY MOUTH EVERY DAY WITH VALSARTAN AND HCTZ     • aspirin 81 MG EC tablet aspirin 81 mg tablet,delayed release     • butalbital-acetaminophen-caffeine (FIORICET, ESGIC) -40 MG per tablet butalbital-acetaminophen-caff -40 mg oral tablet take 1 - 2 tablets by oral route every 4 hours as needed not to exceed 6 tablets per 24hrs   Suspended     • Cholecalciferol (Vitamin D3) 1.25 MG (01691 UT) capsule Take 50,000 Units by mouth 1 (One) Time Per Week.     • Coenzyme Q10 (Co Q-10) 100 MG capsule Take 1 capsule by mouth Daily.     • desvenlafaxine (PRISTIQ) 50 MG 24 hr tablet Take 50 mg by mouth Daily.     • famotidine (PEPCID) 20 MG tablet TAKE 1 TABLET BY MOUTH TWICE DAILY FOR 14 DAYS     • fexofenadine (ALLEGRA) 180 MG tablet fexofenadine 180 mg tablet   TAKE 1 TABLET BY MOUTH EVERY DAY     • gabapentin (NEURONTIN) 600 MG tablet Take 1,200 mg by mouth 3 (Three) Times a Day.     • hydroCHLOROthiazide (HYDRODIURIL) 12.5 MG tablet TAKE 1 TABLET BY MOUTH EVERY DAY ALONG WITH AMLODIPINE AND VALSARTAN     • Hyoscyamine Sulfate SL 0.125 MG sublingual tablet hyoscyamine 0.125 mg sublingual tablet   PLACE 1 TABLET UNDER THE TONGUE EVERY 4 HOURS AS NEEDED     • Lactobacillus (Acidophilus/L-Sporogenes) tablet Take 1 tablet by mouth Every Morning.     • linaclotide (LINZESS) 145 MCG capsule capsule Linzess 145 mcg capsule   TAKE 1 CAPSULE BY MOUTH EVERY DAY     • meloxicam (MOBIC) 15 MG tablet Take 15 mg by mouth Daily. with food     • metFORMIN (GLUCOPHAGE) 500 MG tablet Take 500 mg by mouth Daily.     • montelukast (SINGULAIR) 10 MG tablet Take 10 mg by mouth Every Morning.     • nitrofurantoin (MACRODANTIN) 50 MG capsule Take 50 mg by mouth  every night at bedtime.     • Nurtec 75 MG dispersible tablet Take 1 tablet every other day and as needed daily for migraine prevention     • oxyCODONE (ROXICODONE) 10 MG tablet TAKE 1 TABLET BY MOUTH EVERY 6 HOURS AS NEEDED FOR PAIN MAY CAUSE DROWSINESS (Patient not taking: Reported on 4/17/2023)     • pantoprazole (PROTONIX) 40 MG EC tablet Take 40 mg by mouth 2 (Two) Times a Day.     • promethazine-dextromethorphan (PROMETHAZINE-DM) 6.25-15 MG/5ML syrup TAKE 5ML BY MOUTH EVERY 4 TO 6 HOURS MAY CAUSE DROWSINESS     • rosuvastatin (CRESTOR) 10 MG tablet Take 10 mg by mouth Daily.     • tiZANidine (ZANAFLEX) 4 MG tablet TAKE 1 TO 2 TABLETS BY MOUTH THREE TIMES DAILY AS NEEDED MAY CAUSE DROWSINESS     • valsartan (DIOVAN) 160 MG tablet TAKE 1 TABLET BY MOUTH EVERY DAY along with amlodipine and hctz     • vitamin D (ERGOCALCIFEROL) 1.25 MG (32964 UT) capsule capsule Take 50,000 Units by mouth 1 (One) Time Per Week.       No current facility-administered medications on file prior to visit.       Allergies   Allergen Reactions   • Erythromycin Other (See Comments)     Lockjaw     • Hydrocodone-Acetaminophen Nausea And Vomiting   • Lisinopril Itching and Other (See Comments)     Shaky, jerking     • Shellfish-Derived Products Other (See Comments)     Per allergy testing   • Codeine Itching, Nausea Only, Nausea And Vomiting, Rash and GI Intolerance   • Paroxetine Itching, Nausea And Vomiting and Rash     Past Medical History:   Diagnosis Date   • Arthritis    • Diabetes    • High blood pressure    • High cholesterol    • Vertigo      Past Surgical History:   Procedure Laterality Date   • EYE SURGERY     • HAND SURGERY N/A     2008,2020,2021   • HYSTERECTOMY      partial 2001     Social History     Socioeconomic History   • Marital status:    Tobacco Use   • Smoking status: Every Day     Packs/day: 0.50     Years: 10.00     Pack years: 5.00     Types: Cigarettes   • Smokeless tobacco: Never   Vaping Use   • Vaping  Use: Never used   Substance and Sexual Activity   • Drug use: Defer   • Sexual activity: Defer     Family History   Problem Relation Age of Onset   • Cancer Mother    • Hypertension Father    • Diabetes Father    • Heart disease Father    • Hypertension Brother    • Diabetes Brother    • Cancer Brother      Immunization History   Administered Date(s) Administered   • COVID-19 (PFIZER) PURPLE CAP 03/20/2021, 04/10/2021, 11/10/2021   • COVID-19 (UNSPECIFIED) 03/20/2021, 04/10/2021   • FluLaval/Fluzone >6mos 10/02/2020, 10/27/2021   • Fluzone Quad >6mos (Multi-dose) 11/28/2017   • Tdap 09/28/2020   • flucelvax quad pfs =>4 YRS 10/26/2018       Objective   Physical Exam  Vitals and nursing note reviewed.   Constitutional:       Appearance: Normal appearance. She is obese.   HENT:      Head: Normocephalic and atraumatic.      Nose: Nose normal.      Mouth/Throat:      Mouth: Mucous membranes are moist.   Musculoskeletal:         General: Normal range of motion.      Cervical back: Normal range of motion and neck supple.   Neurological:      Mental Status: She is alert and oriented to person, place, and time.   Psychiatric:         Mood and Affect: Mood normal.         Behavior: Behavior normal.         Thought Content: Thought content normal.         Judgment: Judgment normal.         Vitals:    04/17/23 1024   BP: 144/87   Pulse: 103   Resp: 18   Temp: 98.4 °F (36.9 °C)   TempSrc: Temporal   SpO2: 97%   Weight: Comment: not able to obtain   PainSc:   8   PainLoc: Back     ECOG score: 0         ECOG: (0) Fully Active - Able to Carry On All Pre-disease Performance Without Restriction  Fall Risk Assessment was completed, and patient is at moderate risk for falls.  PHQ-9 Total Score: 0       The patient is  experiencing fatigue. Fatigue score: 5    PT/OT Functional Screening: PT fx screen: No needs identified  Speech Functional Screening: Speech fx screen: No needs identified  Rehab to be ordered: Rehab to be ordered: No  needs identified        Result Review :   The following data was reviewed by: Patrick Howell MD on 04/17/23:  Lab Results   Component Value Date    HGB 12.8 04/17/2023    HCT 40.3 04/17/2023    MCV 88.4 04/17/2023     04/17/2023    WBC 13.06 (H) 04/17/2023    NEUTROABS 9.60 (H) 04/17/2023    LYMPHSABS 2.44 04/17/2023    MONOSABS 0.83 04/17/2023    EOSABS 0.13 04/17/2023    BASOSABS 0.03 04/17/2023     Lab Results   Component Value Date    GLUCOSE 124 (H) 09/06/2022    BUN 11 09/06/2022    CREATININE 0.84 09/06/2022     09/06/2022    K 3.3 (L) 09/06/2022    CL 96 (L) 09/06/2022    CO2 24.1 09/06/2022    CALCIUM 9.4 09/06/2022    PROTEINTOT 6.9 09/06/2022    ALBUMIN 4.30 09/06/2022    BILITOT 0.3 09/06/2022    ALKPHOS 93 09/06/2022    AST 11 09/06/2022    ALT 16 09/06/2022     Labs personally reviewed and WBC increased but improved from prior.     Smear reviewed: neutrophilia noted       Assessment and Plan    Diagnoses and all orders for this visit:    1. Leukocytosis, unspecified type (Primary)  -     CBC & Differential; Future    WBC cound has been in the 14-16 range. Neutrophilia noted on differential and smear. Smear with normal morphology. Sed rate increased as is RF. Patient's WBC count is likely reactive due to patient's medical conditions which include MS due to findings above including elevated sed rate and the neutrophilia, therefore unlikely to return to normal. She is also a tobacco smoker which is a known cause of leukocytosis. There is no evidence of primary blood disorder or active infection at this time. Reassurance provided to patient. She follows with MS physician next month. Smoking cessation recommended. Repeat CBC in 12 months.       I spent 15 minutes caring for Katrin on this date of service. This time includes time spent by me in the following activities:preparing for the visit, reviewing tests, obtaining and/or reviewing a separately obtained history, performing a  medically appropriate examination and/or evaluation , counseling and educating the patient/family/caregiver, ordering medications, tests, or procedures, referring and communicating with other health care professionals , documenting information in the medical record, independently interpreting results and communicating that information with the patient/family/caregiver and care coordination    Patient Follow Up: 12 mos with repeat CBC prior.     Patient was given instructions and counseling regarding her condition or for health maintenance advice. Please see specific information pulled into the AVS if appropriate.

## 2023-09-05 ENCOUNTER — TELEPHONE (OUTPATIENT)
Dept: ONCOLOGY | Facility: HOSPITAL | Age: 58
End: 2023-09-05

## 2023-09-05 NOTE — TELEPHONE ENCOUNTER
Caller: Katrin Parham    Relationship: Self    Best call back number: [phone]      What was the call regarding: PATIENTS PCP DONE LABS AND WANTED PATIENT TO BE SEEN ASAP    PATIENT STATES THEY ALREADY FAXED OVER THE LABS TO THE OFFICE

## 2023-09-11 NOTE — TELEPHONE ENCOUNTER
Caller: Katrin Parham    Relationship to patient: Self    Best call back number: [phone]    Patient is needing: TO GET A F/U APPT SOONER THAN 2024. SHE NEEDS ONE SOON. SHE IS CALLING TO CHECK ON STATUS.

## 2023-09-14 ENCOUNTER — LAB (OUTPATIENT)
Dept: LAB | Facility: HOSPITAL | Age: 58
End: 2023-09-14
Payer: COMMERCIAL

## 2023-09-14 DIAGNOSIS — D72.829 LEUKOCYTOSIS, UNSPECIFIED TYPE: ICD-10-CM

## 2023-09-14 LAB
BASOPHILS # BLD AUTO: 0.07 10*3/MM3 (ref 0–0.2)
BASOPHILS NFR BLD AUTO: 0.4 % (ref 0–1.5)
DEPRECATED RDW RBC AUTO: 42.4 FL (ref 37–54)
EOSINOPHIL # BLD AUTO: 0.14 10*3/MM3 (ref 0–0.4)
EOSINOPHIL NFR BLD AUTO: 0.8 % (ref 0.3–6.2)
ERYTHROCYTE [DISTWIDTH] IN BLOOD BY AUTOMATED COUNT: 14 % (ref 12.3–15.4)
HCT VFR BLD AUTO: 35.6 % (ref 34–46.6)
HGB BLD-MCNC: 11.7 G/DL (ref 12–15.9)
IMM GRANULOCYTES # BLD AUTO: 0.14 10*3/MM3 (ref 0–0.05)
IMM GRANULOCYTES NFR BLD AUTO: 0.8 % (ref 0–0.5)
LYMPHOCYTES # BLD AUTO: 3.72 10*3/MM3 (ref 0.7–3.1)
LYMPHOCYTES NFR BLD AUTO: 22.4 % (ref 19.6–45.3)
MCH RBC QN AUTO: 28.1 PG (ref 26.6–33)
MCHC RBC AUTO-ENTMCNC: 32.9 G/DL (ref 31.5–35.7)
MCV RBC AUTO: 85.6 FL (ref 79–97)
MONOCYTES # BLD AUTO: 0.89 10*3/MM3 (ref 0.1–0.9)
MONOCYTES NFR BLD AUTO: 5.4 % (ref 5–12)
NEUTROPHILS NFR BLD AUTO: 11.63 10*3/MM3 (ref 1.7–7)
NEUTROPHILS NFR BLD AUTO: 70.2 % (ref 42.7–76)
NRBC BLD AUTO-RTO: 0.1 /100 WBC (ref 0–0.2)
PLATELET # BLD AUTO: 369 10*3/MM3 (ref 140–450)
PMV BLD AUTO: 9.4 FL (ref 6–12)
RBC # BLD AUTO: 4.16 10*6/MM3 (ref 3.77–5.28)
WBC NRBC COR # BLD: 16.59 10*3/MM3 (ref 3.4–10.8)

## 2023-09-14 PROCEDURE — 85025 COMPLETE CBC W/AUTO DIFF WBC: CPT

## 2023-09-14 PROCEDURE — 36415 COLL VENOUS BLD VENIPUNCTURE: CPT

## 2023-09-15 ENCOUNTER — OFFICE VISIT (OUTPATIENT)
Dept: ONCOLOGY | Facility: HOSPITAL | Age: 58
End: 2023-09-15
Payer: COMMERCIAL

## 2023-09-15 VITALS
RESPIRATION RATE: 18 BRPM | BODY MASS INDEX: 37.45 KG/M2 | OXYGEN SATURATION: 97 % | HEART RATE: 79 BPM | WEIGHT: 198.19 LBS | DIASTOLIC BLOOD PRESSURE: 81 MMHG | TEMPERATURE: 97.1 F | SYSTOLIC BLOOD PRESSURE: 122 MMHG

## 2023-09-15 DIAGNOSIS — D72.829 LEUKOCYTOSIS, UNSPECIFIED TYPE: Primary | ICD-10-CM

## 2023-09-15 PROCEDURE — G0463 HOSPITAL OUTPT CLINIC VISIT: HCPCS | Performed by: INTERNAL MEDICINE

## 2023-09-15 RX ORDER — BUDESONIDE 0.5 MG/2ML
INHALANT ORAL
COMMUNITY

## 2023-09-15 RX ORDER — AMOXICILLIN 250 MG
2 CAPSULE ORAL DAILY
COMMUNITY

## 2023-09-15 NOTE — PROGRESS NOTES
Chief Complaint  Leukocytosis    Ynes Ceja, APRN  Ynes Ceja, APRN    Records Obtained:  Records of the patients history including those obtained from  [facility] and patient information were reviewed and summarized in detail.    Reason for referral: leukocytosis    Subjective          Katrin Parham presents to [facility] for leukocytosis    History of Present Illness  Ms. Katrin Parham presents for follow up for leukocytosis. Patient is a smoker for many years. Reports cutting back on smoking. She has MS and reports her chronic pain is worse.   She has multiple co-morbidities including multiple sclerosis and is on several chronic medications. She has had increasing pain in her bones recently. Has ongoing fatigue.  No fevers, chills, infections reported. Occasional loose stool, she says are from nerves. Denies productive cough.       Hematologic History  7/26/2022: WBC  14.9, Hemoglobin 12.7, platelet count 468 K     8/9/2022: WBC 16.7, hemoglobin 12.7, platelet count 477K    9/6/22: CBC with WBC of 15.39, incr in absolute PMNs, Hgb 11.9, MCV 87.5, plt 392K. JAK2 negative. Smear with absolute neutrophilia, normal morphology. RF positive, sed rate elevated. SHANNAN negative.     4/17/23: WBC 13.06, ANC increased at 9.6, hgb 12.8, plt 401    8/28/23: WBC 18.9    9/14/23: WBC 16.59, hgb 11.7, plt 369, ANC 11.63 (elev), lymphs elevated at 3.72, immature grans slightly high at 0.14      Oncology/Hematology History    No history exists.       Review of Systems   Constitutional:  Positive for fatigue.   Musculoskeletal:  Positive for arthralgias, back pain, gait problem and myalgias.   Neurological:  Positive for headache. Negative for dizziness.   All other systems reviewed and are negative.    Current Outpatient Medications on File Prior to Visit   Medication Sig Dispense Refill    ubrogepant (UBRELVY) 100 MG tablet Take 1 tablet by mouth.      Accu-Chek FastClix Lancets  misc Use as directed to monitor blood glucose THREE TIMES DAILY and AS NEEDED      Accu-Chek Guide test strip Use as directed to monitor blood glucose THREE TIMES DAILY and AS NEEDED      albuterol sulfate  (90 Base) MCG/ACT inhaler INHALE 2 PUFFS BY MOUTH EVERY 4 HOURS SHAKE WELL      amitriptyline (ELAVIL) 25 MG tablet Take 1 tablet by mouth Daily.      amLODIPine (NORVASC) 5 MG tablet TAKE 1 TABLET BY MOUTH EVERY DAY WITH VALSARTAN AND HCTZ      aspirin 81 MG EC tablet aspirin 81 mg tablet,delayed release      budesonide (PULMICORT) 0.5 MG/2ML nebulizer solution INHALE CONTENTS OF 1 VIAL VIA NEBULIZER TWICE DAILY AS NEEDED      butalbital-acetaminophen-caffeine (FIORICET, ESGIC) -40 MG per tablet butalbital-acetaminophen-caff -40 mg oral tablet take 1 - 2 tablets by oral route every 4 hours as needed not to exceed 6 tablets per 24hrs   Suspended      Cholecalciferol (Vitamin D3) 1.25 MG (12273 UT) capsule Take 1 capsule by mouth 1 (One) Time Per Week.      Coenzyme Q10 (Co Q-10) 100 MG capsule Take 100 mg by mouth Daily.      desvenlafaxine (PRISTIQ) 50 MG 24 hr tablet Take 1 tablet by mouth Daily.      famotidine (PEPCID) 20 MG tablet TAKE 1 TABLET BY MOUTH TWICE DAILY FOR 14 DAYS      fexofenadine (ALLEGRA) 180 MG tablet fexofenadine 180 mg tablet   TAKE 1 TABLET BY MOUTH EVERY DAY      fluconazole (DIFLUCAN) 150 MG tablet TAKE 1 TABLET BY MOUTH NOW, THEN REPEAT DOSE IN 4 DAYS      FLUoxetine (PROzac) 40 MG capsule Take 1 capsule by mouth Daily.      gabapentin (NEURONTIN) 600 MG tablet Take 2 tablets by mouth 3 (Three) Times a Day.      hydroCHLOROthiazide (HYDRODIURIL) 12.5 MG tablet TAKE 1 TABLET BY MOUTH EVERY DAY ALONG WITH AMLODIPINE AND VALSARTAN      HYDROmorphone (DILAUDID) 4 MG tablet Take 1 tablet by mouth 3 (Three) Times a Day.      Hyoscyamine Sulfate SL 0.125 MG sublingual tablet hyoscyamine 0.125 mg sublingual tablet   PLACE 1 TABLET UNDER THE TONGUE EVERY 4 HOURS AS NEEDED       Lactobacillus (Acidophilus/L-Sporogenes) tablet Take 1 tablet by mouth Every Morning.      linaclotide (LINZESS) 145 MCG capsule capsule Linzess 145 mcg capsule   TAKE 1 CAPSULE BY MOUTH EVERY DAY      meloxicam (MOBIC) 15 MG tablet Take 1 tablet by mouth Daily. with food      metFORMIN (GLUCOPHAGE) 500 MG tablet Take 1 tablet by mouth Daily.      montelukast (SINGULAIR) 10 MG tablet Take 1 tablet by mouth Every Morning.      naloxone (NARCAN) 4 MG/0.1ML nasal spray SPRAY 1 SPRAY IN ONE NOSTRIL AS NEEDED FOR OPIOID OVERDOSE THEN CALL 911. MAY REPEAT DOSE IN ALTERNATE NOSTRIL AFTER 2 TO 3 MINUTES IF NO RESPONSE      Nebulizers (InnoSpire Essence Nebulizer) misc See Admin Instructions.      nitrofurantoin (MACRODANTIN) 50 MG capsule Take 1 capsule by mouth every night at bedtime.      Nurtec 75 MG dispersible tablet Take 1 tablet every other day and as needed daily for migraine prevention      nystatin (MYCOSTATIN) 100,000 unit/mL suspension TAKE 10ML BY MOUTH FOUR TIMES DAILY SHAKE WELL      oxyCODONE (ROXICODONE) 10 MG tablet TAKE 1 TABLET BY MOUTH EVERY 6 HOURS AS NEEDED FOR PAIN MAY CAUSE DROWSINESS (Patient not taking: Reported on 4/17/2023)      pantoprazole (PROTONIX) 40 MG EC tablet Take 1 tablet by mouth 2 (Two) Times a Day.      promethazine-dextromethorphan (PROMETHAZINE-DM) 6.25-15 MG/5ML syrup TAKE 5ML BY MOUTH EVERY 4 TO 6 HOURS MAY CAUSE DROWSINESS      propranolol (INDERAL) 10 MG tablet Take 1 tablet by mouth Daily.      rosuvastatin (CRESTOR) 10 MG tablet Take 1 tablet by mouth Daily.      sennosides-docusate (PERICOLACE) 8.6-50 MG per tablet Take 2 tablets by mouth Daily.      temazepam (RESTORIL) 30 MG capsule Take 1 capsule by mouth Daily.      tiZANidine (ZANAFLEX) 4 MG tablet TAKE 1 TO 2 TABLETS BY MOUTH THREE TIMES DAILY AS NEEDED MAY CAUSE DROWSINESS      valsartan (DIOVAN) 160 MG tablet TAKE 1 TABLET BY MOUTH EVERY DAY along with amlodipine and hctz      vitamin D (ERGOCALCIFEROL) 1.25 MG  (59733 UT) capsule capsule Take 1 capsule by mouth 1 (One) Time Per Week.       No current facility-administered medications on file prior to visit.       Allergies   Allergen Reactions    Oxycodone Rash    Erythromycin Other (See Comments)     Lockjaw      Hydrocodone Nausea Only and Nausea And Vomiting    Hydrocodone-Acetaminophen Nausea And Vomiting    Lisinopril Itching and Other (See Comments)     Shaky, jerking      Shellfish-Derived Products Other (See Comments)     Per allergy testing    Codeine Itching, Nausea Only, Nausea And Vomiting, Rash and GI Intolerance    Paroxetine Itching, Nausea And Vomiting and Rash     Past Medical History:   Diagnosis Date    Arthritis     Diabetes     High blood pressure     High cholesterol     Vertigo      Past Surgical History:   Procedure Laterality Date    EYE SURGERY      HAND SURGERY N/A     2008,2020,2021    HYSTERECTOMY      partial 2001     Social History     Socioeconomic History    Marital status:    Tobacco Use    Smoking status: Every Day     Packs/day: 0.50     Years: 10.00     Pack years: 5.00     Types: Cigarettes    Smokeless tobacco: Never   Vaping Use    Vaping Use: Never used   Substance and Sexual Activity    Drug use: Defer    Sexual activity: Defer     Family History   Problem Relation Age of Onset    Cancer Mother     Hypertension Father     Diabetes Father     Heart disease Father     Hypertension Brother     Diabetes Brother     Cancer Brother      Immunization History   Administered Date(s) Administered    COVID-19 (PFIZER) BIVALENT 12+YRS 10/05/2022    COVID-19 (PFIZER) Purple Cap Monovalent 03/20/2021, 04/10/2021, 11/10/2021    COVID-19 (UNSPECIFIED) 03/20/2021, 04/10/2021    Fluzone (or Fluarix & Flulaval for VFC) >6mos 10/02/2020, 10/27/2021, 10/05/2022    Fluzone Quad >6mos (Multi-dose) 11/28/2017    Influenza MDCK Quadrivalent with Preserative 10/26/2018    Tdap 09/28/2020    flucelvax quad pfs =>4 YRS 10/26/2018       Objective    Physical Exam  Well appearing patient in no acute distress on RA  Anicteric sclerae, no rash on exposed skin  Respirations non-labored  Awake, alert, and oriented x 4. Speech intact. No gross neurologic deficit  Appropriate mood and affect    Vitals:    09/15/23 0812   BP: 122/81   Pulse: 79   Resp: 18   Temp: 97.1 °F (36.2 °C)   SpO2: 97%   Weight: 89.9 kg (198 lb 3.1 oz)   PainSc:   8   PainLoc: Generalized               ECOG: (0) Fully Active - Able to Carry On All Pre-disease Performance Without Restriction  Fall Risk Assessment was completed, and patient is at moderate risk for falls.  PHQ-9 Total Score:         The patient is  experiencing fatigue. Fatigue score: 5    PT/OT Functional Screening: PT fx screen: No needs identified  Speech Functional Screening: Speech fx screen: No needs identified  Rehab to be ordered: Rehab to be ordered: No needs identified        Result Review :   The following data was reviewed by: Patrick Howell MD on 09/15/23:  Lab Results   Component Value Date    HGB 11.7 (L) 09/14/2023    HCT 35.6 09/14/2023    MCV 85.6 09/14/2023     09/14/2023    WBC 16.59 (H) 09/14/2023    NEUTROABS 11.63 (H) 09/14/2023    LYMPHSABS 3.72 (H) 09/14/2023    MONOSABS 0.89 09/14/2023    EOSABS 0.14 09/14/2023    BASOSABS 0.07 09/14/2023     Lab Results   Component Value Date    GLUCOSE 124 (H) 09/06/2022    BUN 11 09/06/2022    CREATININE 0.84 09/06/2022     09/06/2022    K 3.3 (L) 09/06/2022    CL 96 (L) 09/06/2022    CO2 24.1 09/06/2022    CALCIUM 9.4 09/06/2022    PROTEINTOT 6.9 09/06/2022    ALBUMIN 4.30 09/06/2022    BILITOT 0.3 09/06/2022    ALKPHOS 93 09/06/2022    AST 11 09/06/2022    ALT 16 09/06/2022     Labs personally reviewed and WBC increased but overall stable.     Smear reviewed: neutrophilia noted       Assessment and Plan    Diagnoses and all orders for this visit:    1. Leukocytosis, unspecified type (Primary)  -     CBC & Differential; Future    WBC cound has  been in the 14-18 range. Neutrophilia, lymphophilia and immature grans noted on differential and smear. Smear with normal morphology. Sed rate increased as is RF. Patient's WBC count is likely reactive due to patient's medical conditions which include MS due to findings above. This includes elevated sed rate and the neutrophilia with increase immature grans, therefore she likely will have fluctuations in her WBC from 13-19K and her WBC is unlikely to return to normal. She is also a tobacco smoker which is a known cause of leukocytosis. There is no evidence of primary blood disorder or active infection at this time. Reassurance provided to patient. Smoking cessation recommended. Repeat CBC in 12 months.       I spent 15 minutes caring for Katrin on this date of service. This time includes time spent by me in the following activities:preparing for the visit, reviewing tests, obtaining and/or reviewing a separately obtained history, performing a medically appropriate examination and/or evaluation , counseling and educating the patient/family/caregiver, ordering medications, tests, or procedures, referring and communicating with other health care professionals , documenting information in the medical record, independently interpreting results and communicating that information with the patient/family/caregiver and care coordination    Patient Follow Up: 12 mos with repeat CBC prior.     Patient was given instructions and counseling regarding her condition or for health maintenance advice. Please see specific information pulled into the AVS if appropriate.

## 2024-01-22 ENCOUNTER — TELEPHONE (OUTPATIENT)
Dept: ONCOLOGY | Facility: HOSPITAL | Age: 59
End: 2024-01-22
Payer: COMMERCIAL

## 2024-01-22 NOTE — TELEPHONE ENCOUNTER
Caller: Katrin Parham    Relationship to patient: Self    Best call back number: [phone]    Patient is needing: TO MAKE AN APPT WITH DR. GUERRA. SHE STATES HER PCP TOLD HER TO CONTACT US TO MAKE AN APPT BECAUSE HER WHITE COUNT WAS UP AND THEY FAXED US THE RESULTS.

## 2024-02-02 ENCOUNTER — OFFICE VISIT (OUTPATIENT)
Dept: ONCOLOGY | Facility: HOSPITAL | Age: 59
End: 2024-02-02
Payer: COMMERCIAL

## 2024-02-02 ENCOUNTER — LAB (OUTPATIENT)
Dept: ONCOLOGY | Facility: HOSPITAL | Age: 59
End: 2024-02-02
Payer: COMMERCIAL

## 2024-02-02 VITALS
TEMPERATURE: 97.8 F | HEART RATE: 99 BPM | WEIGHT: 184.53 LBS | BODY MASS INDEX: 34.87 KG/M2 | SYSTOLIC BLOOD PRESSURE: 134 MMHG | RESPIRATION RATE: 18 BRPM | DIASTOLIC BLOOD PRESSURE: 69 MMHG | OXYGEN SATURATION: 100 %

## 2024-02-02 DIAGNOSIS — D64.9 ANEMIA, UNSPECIFIED TYPE: Primary | ICD-10-CM

## 2024-02-02 DIAGNOSIS — F17.219 CIGARETTE NICOTINE DEPENDENCE WITH NICOTINE-INDUCED DISORDER: ICD-10-CM

## 2024-02-02 DIAGNOSIS — D72.829 LEUKOCYTOSIS, UNSPECIFIED TYPE: Primary | ICD-10-CM

## 2024-02-02 LAB
ANISOCYTOSIS BLD QL: ABNORMAL
BASOPHILS # BLD AUTO: 0.02 10*3/MM3 (ref 0–0.2)
BASOPHILS # BLD MANUAL: 0.17 10*3/MM3 (ref 0–0.2)
BASOPHILS NFR BLD AUTO: 0.1 % (ref 0–1.5)
BASOPHILS NFR BLD MANUAL: 1 % (ref 0–1.5)
CYTO UR: NORMAL
DACRYOCYTES BLD QL SMEAR: ABNORMAL
DEPRECATED RDW RBC AUTO: 51.2 FL (ref 37–54)
EOSINOPHIL # BLD AUTO: 0.2 10*3/MM3 (ref 0–0.4)
EOSINOPHIL # BLD MANUAL: 0.34 10*3/MM3 (ref 0–0.4)
EOSINOPHIL NFR BLD AUTO: 1.2 % (ref 0.3–6.2)
EOSINOPHIL NFR BLD MANUAL: 2 % (ref 0.3–6.2)
ERYTHROCYTE [DISTWIDTH] IN BLOOD BY AUTOMATED COUNT: 15.2 % (ref 12.3–15.4)
HCT VFR BLD AUTO: 39 % (ref 34–46.6)
HGB BLD-MCNC: 12.3 G/DL (ref 12–15.9)
IMM GRANULOCYTES # BLD AUTO: 0.05 10*3/MM3 (ref 0–0.05)
IMM GRANULOCYTES NFR BLD AUTO: 0.3 % (ref 0–0.5)
LAB AP CASE REPORT: NORMAL
LAB AP CLINICAL INFORMATION: NORMAL
LYMPHOCYTES # BLD AUTO: 2.16 10*3/MM3 (ref 0.7–3.1)
LYMPHOCYTES # BLD MANUAL: 1.34 10*3/MM3 (ref 0.7–3.1)
LYMPHOCYTES NFR BLD AUTO: 12.9 % (ref 19.6–45.3)
LYMPHOCYTES NFR BLD MANUAL: 7 % (ref 5–12)
MCH RBC QN AUTO: 28.7 PG (ref 26.6–33)
MCHC RBC AUTO-ENTMCNC: 31.5 G/DL (ref 31.5–35.7)
MCV RBC AUTO: 90.9 FL (ref 79–97)
MICROCYTES BLD QL: ABNORMAL
MONOCYTES # BLD AUTO: 1.19 10*3/MM3 (ref 0.1–0.9)
MONOCYTES # BLD: 1.17 10*3/MM3 (ref 0.1–0.9)
MONOCYTES NFR BLD AUTO: 7.1 % (ref 5–12)
NEUTROPHILS # BLD AUTO: 13.76 10*3/MM3 (ref 1.7–7)
NEUTROPHILS NFR BLD AUTO: 13.16 10*3/MM3 (ref 1.7–7)
NEUTROPHILS NFR BLD AUTO: 78.4 % (ref 42.7–76)
NEUTROPHILS NFR BLD MANUAL: 82 % (ref 42.7–76)
PATH REPORT.FINAL DX SPEC: NORMAL
PATH REPORT.GROSS SPEC: NORMAL
PATHOLOGY REVIEW: YES
PLATELET # BLD AUTO: 359 10*3/MM3 (ref 140–450)
PMV BLD AUTO: 8.9 FL (ref 6–12)
POIKILOCYTOSIS BLD QL SMEAR: ABNORMAL
RBC # BLD AUTO: 4.29 10*6/MM3 (ref 3.77–5.28)
SMALL PLATELETS BLD QL SMEAR: ADEQUATE
VARIANT LYMPHS NFR BLD MANUAL: 8 % (ref 19.6–45.3)
WBC MORPH BLD: NORMAL
WBC NRBC COR # BLD AUTO: 16.78 10*3/MM3 (ref 3.4–10.8)

## 2024-02-02 PROCEDURE — 85025 COMPLETE CBC W/AUTO DIFF WBC: CPT | Performed by: NURSE PRACTITIONER

## 2024-02-02 PROCEDURE — G0463 HOSPITAL OUTPT CLINIC VISIT: HCPCS | Performed by: NURSE PRACTITIONER

## 2024-02-02 PROCEDURE — 36415 COLL VENOUS BLD VENIPUNCTURE: CPT | Performed by: NURSE PRACTITIONER

## 2024-02-02 NOTE — PROGRESS NOTES
Chief Complaint/Reason for Referral:  Leukocytosis, unspecified type    Ynes Ceja, APRN  Brenna, Ynes G, APRN      Subjective    History of Present Illness    Ms. Katrin Parham presents for acute care visit for elevated WBC count. She saw Dr. Howell back in September for chronic leukocytosis. She has follow up scheduled for September to see him again. She reports has been on steroids recently for a possible allergic type reaction to meat. She is currently undergoing work up for possible alpha gal. She still smokes about 4-5 cigarettes a day. History of MS currently not on any meds for this. She comes in with a walker today. Reports broke her back after a fall at home in November. Follows with neurology Dr. Gonzalez in [place] for the MS. Denies any fevers, chill, lymphadenopathy. Reports she did lose 30 pounds when her back injury. Denies any recent antibiotic use. WBC has been in the range of 14-18 with previous known neutrophilia, lymphophilia and immature grans on differential ans smear but all were of normal morphology. Leukocytosis was felt to be reactive secondary to chronic tobacco abuse, co-morbid illness including the MS.     Lab work dated 1/12/2024: WBC 18.24, Hemoglobin 12.2, platelet count 413, differential with increased  [place] 54[address], absolute lymphocytosis of 5100, absolute monocytosis of 1400.         Oncology/Hematology History    No history exists.       Review of Systems   Constitutional:  Positive for fatigue. Negative for appetite change, diaphoresis, fever, unexpected weight gain and unexpected weight loss.   HENT:  Negative for hearing loss, mouth sores, sore throat, swollen glands, trouble swallowing and voice change.    Eyes:  Negative for blurred vision.   Respiratory:  Negative for cough, shortness of breath and wheezing.    Cardiovascular:  Negative for chest pain and palpitations.   Gastrointestinal:  Negative for abdominal pain, blood in stool, constipation,  diarrhea, nausea and vomiting.   Endocrine: Negative for cold intolerance and heat intolerance.   Genitourinary:  Negative for difficulty urinating, dysuria, frequency, hematuria and urinary incontinence.   Musculoskeletal:  Positive for arthralgias and back pain. Negative for myalgias.   Skin:  Negative for rash, skin lesions and wound.   Neurological:  Negative for dizziness, seizures, weakness, numbness and headache.   Hematological:  Does not bruise/bleed easily.   Psychiatric/Behavioral:  Negative for depressed mood. The patient is not nervous/anxious.    All other systems reviewed and are negative.      Current Outpatient Medications on File Prior to Visit   Medication Sig Dispense Refill    Accu-Chek FastClix Lancets misc Use as directed to monitor blood glucose THREE TIMES DAILY and AS NEEDED      Accu-Chek Guide test strip Use as directed to monitor blood glucose THREE TIMES DAILY and AS NEEDED      albuterol sulfate  (90 Base) MCG/ACT inhaler INHALE 2 PUFFS BY MOUTH EVERY 4 HOURS SHAKE WELL      amitriptyline (ELAVIL) 25 MG tablet Take 1 tablet by mouth Daily.      amLODIPine (NORVASC) 5 MG tablet TAKE 1 TABLET BY MOUTH EVERY DAY WITH VALSARTAN AND HCTZ      aspirin 81 MG EC tablet aspirin 81 mg tablet,delayed release      budesonide (PULMICORT) 0.5 MG/2ML nebulizer solution INHALE CONTENTS OF 1 VIAL VIA NEBULIZER TWICE DAILY AS NEEDED      butalbital-acetaminophen-caffeine (FIORICET, ESGIC) -40 MG per tablet butalbital-acetaminophen-caff -40 mg oral tablet take 1 - 2 tablets by oral route every 4 hours as needed not to exceed 6 tablets per 24hrs   Suspended      Cholecalciferol (Vitamin D3) 1.25 MG (29251 UT) capsule Take 1 capsule by mouth 1 (One) Time Per Week.      Coenzyme Q10 (Co Q-10) 100 MG capsule Take 100 mg by mouth Daily.      desvenlafaxine (PRISTIQ) 50 MG 24 hr tablet Take 1 tablet by mouth Daily.      famotidine (PEPCID) 20 MG tablet TAKE 1 TABLET BY MOUTH TWICE DAILY FOR  14 DAYS      fexofenadine (ALLEGRA) 180 MG tablet fexofenadine 180 mg tablet   TAKE 1 TABLET BY MOUTH EVERY DAY      fluconazole (DIFLUCAN) 150 MG tablet TAKE 1 TABLET BY MOUTH NOW, THEN REPEAT DOSE IN 4 DAYS      FLUoxetine (PROzac) 40 MG capsule Take 1 capsule by mouth Daily.      gabapentin (NEURONTIN) 600 MG tablet Take 2 tablets by mouth 3 (Three) Times a Day. (Patient not taking: Reported on 2/2/2024)      hydroCHLOROthiazide (HYDRODIURIL) 12.5 MG tablet TAKE 1 TABLET BY MOUTH EVERY DAY ALONG WITH AMLODIPINE AND VALSARTAN      HYDROmorphone (DILAUDID) 4 MG tablet Take 1 tablet by mouth 3 (Three) Times a Day.      Hyoscyamine Sulfate SL 0.125 MG sublingual tablet hyoscyamine 0.125 mg sublingual tablet   PLACE 1 TABLET UNDER THE TONGUE EVERY 4 HOURS AS NEEDED      Lactobacillus (Acidophilus/L-Sporogenes) tablet Take 1 tablet by mouth Every Morning.      linaclotide (LINZESS) 145 MCG capsule capsule Linzess 145 mcg capsule   TAKE 1 CAPSULE BY MOUTH EVERY DAY      meloxicam (MOBIC) 15 MG tablet Take 1 tablet by mouth Daily. with food      metFORMIN (GLUCOPHAGE) 500 MG tablet Take 1 tablet by mouth Daily.      montelukast (SINGULAIR) 10 MG tablet Take 1 tablet by mouth Every Morning.      naloxone (NARCAN) 4 MG/0.1ML nasal spray SPRAY 1 SPRAY IN ONE NOSTRIL AS NEEDED FOR OPIOID OVERDOSE THEN CALL 911. MAY REPEAT DOSE IN ALTERNATE NOSTRIL AFTER 2 TO 3 MINUTES IF NO RESPONSE      Nebulizers (InnoSpire Essence Nebulizer) misc See Admin Instructions.      nitrofurantoin (MACRODANTIN) 50 MG capsule Take 1 capsule by mouth every night at bedtime.      Nurtec 75 MG dispersible tablet Take 1 tablet every other day and as needed daily for migraine prevention      nystatin (MYCOSTATIN) 100,000 unit/mL suspension TAKE 10ML BY MOUTH FOUR TIMES DAILY SHAKE WELL      oxyCODONE (ROXICODONE) 10 MG tablet       pantoprazole (PROTONIX) 40 MG EC tablet Take 1 tablet by mouth 2 (Two) Times a Day.      promethazine-dextromethorphan  (PROMETHAZINE-DM) 6.25-15 MG/5ML syrup TAKE 5ML BY MOUTH EVERY 4 TO 6 HOURS MAY CAUSE DROWSINESS      propranolol (INDERAL) 10 MG tablet Take 1 tablet by mouth Daily.      rosuvastatin (CRESTOR) 10 MG tablet Take 1 tablet by mouth Daily.      sennosides-docusate (PERICOLACE) 8.6-50 MG per tablet Take 2 tablets by mouth Daily.      temazepam (RESTORIL) 30 MG capsule Take 1 capsule by mouth Daily.      tiZANidine (ZANAFLEX) 4 MG tablet TAKE 1 TO 2 TABLETS BY MOUTH THREE TIMES DAILY AS NEEDED MAY CAUSE DROWSINESS      ubrogepant (UBRELVY) 100 MG tablet Take 1 tablet by mouth.      valsartan (DIOVAN) 160 MG tablet TAKE 1 TABLET BY MOUTH EVERY DAY along with amlodipine and hctz      vitamin D (ERGOCALCIFEROL) 1.25 MG (42251 UT) capsule capsule Take 1 capsule by mouth 1 (One) Time Per Week.       No current facility-administered medications on file prior to visit.       Allergies   Allergen Reactions    Oxycodone Rash    Erythromycin Other (See Comments)     Lockjaw      Hydrocodone Nausea Only and Nausea And Vomiting    Hydrocodone-Acetaminophen Nausea And Vomiting    Lisinopril Itching and Other (See Comments)     Shaky, jerking      Shellfish-Derived Products Other (See Comments)     Per allergy testing    Codeine Itching, Nausea Only, Nausea And Vomiting, Rash and GI Intolerance    Paroxetine Itching, Nausea And Vomiting and Rash     Past Medical History:   Diagnosis Date    Arthritis     Diabetes     High blood pressure     High cholesterol     Vertigo      Past Surgical History:   Procedure Laterality Date    EYE SURGERY      HAND SURGERY N/A     2008,2020,2021    HYSTERECTOMY      partial 2001     Social History     Socioeconomic History    Marital status:    Tobacco Use    Smoking status: Every Day     Packs/day: 0.25     Years: 10.00     Additional pack years: 0.00     Total pack years: 2.50     Types: Cigarettes    Smokeless tobacco: Never    Tobacco comments:     4-5 cigs daily   Vaping Use    Vaping  Use: Never used   Substance and Sexual Activity    Alcohol use: Never    Drug use: Defer    Sexual activity: Defer     Family History   Problem Relation Age of Onset    Cancer Mother     Hypertension Father     Diabetes Father     Heart disease Father     Hypertension Brother     Diabetes Brother     Cancer Brother      Immunization History   Administered Date(s) Administered    COVID-19 (PFIZER) BIVALENT 12+YRS 10/05/2022    COVID-19 (PFIZER) Purple Cap Monovalent 03/20/2021, 04/10/2021, 11/10/2021    COVID-19 (UNSPECIFIED) 03/20/2021, 04/10/2021    COVID-19 F23 (MODERNA) 12YRS+ (SPIKEVAX) 11/04/2023    Fluzone (or Fluarix & Flulaval for VFC) >6mos 10/02/2020, 10/27/2021, 10/05/2022, 11/04/2023    Fluzone Quad >6mos (Multi-dose) 11/28/2017    Influenza MDCK Quadrivalent with Preserative 10/26/2018    Tdap 09/28/2020    flucelvax quad pfs =>4 YRS 10/26/2018       Tobacco Use: High Risk (2/2/2024)    Patient History     Smoking Tobacco Use: Every Day     Smokeless Tobacco Use: Never     Passive Exposure: Not on file       Objective     Physical Exam  Vitals and nursing note reviewed.   Constitutional:       Appearance: Normal appearance. She is obese.   HENT:      Head: Normocephalic.      Nose: Nose normal.      Mouth/Throat:      Mouth: Mucous membranes are moist.   Eyes:      Pupils: Pupils are equal, round, and reactive to light.   Cardiovascular:      Rate and Rhythm: Normal rate and regular rhythm.      Pulses: Normal pulses.      Heart sounds: Normal heart sounds.   Pulmonary:      Effort: Pulmonary effort is normal.      Breath sounds: Normal breath sounds.   Abdominal:      General: Bowel sounds are normal. There is no distension.      Palpations: Abdomen is soft.   Musculoskeletal:         General: Normal range of motion.      Cervical back: Normal range of motion.   Skin:     General: Skin is warm and dry.      Capillary Refill: Capillary refill takes less than 2 seconds.      Coloration: Skin is not  jaundiced.   Neurological:      General: No focal deficit present.      Mental Status: She is alert and oriented to person, place, and time.   Psychiatric:         Mood and Affect: Mood normal.         Behavior: Behavior normal.         Thought Content: Thought content normal.         Judgment: Judgment normal.         Vitals:    02/02/24 0926   BP: 134/69   Pulse: 99   Resp: 18   Temp: 97.8 °F (36.6 °C)   SpO2: 100%   Weight: 83.7 kg (184 lb 8.4 oz)   PainSc:   8   PainLoc: Back       Wt Readings from Last 3 Encounters:   02/02/24 83.7 kg (184 lb 8.4 oz)   09/15/23 89.9 kg (198 lb 3.1 oz)   09/30/22 91.8 kg (202 lb 6.1 oz)               ECOG score: 1         ECOG: (1) Restricted in Physically Strenuous Activity, Ambulatory & Able to Do Work of Light Nature  Fall Risk Assessment was completed, and patient is at low risk for falls.  PHQ-9 Total Score: 0       The patient is  experiencing fatigue. Fatigue score: 5    PT/OT Functional Screening: PT fx screen : Difficulty Walking  Speech Functional Screening: Speech fx screen : No needs identified  Rehab to be ordered: Rehab to be ordered : No needs identified        Result Review :  The following data was reviewed by: MONICA Brown on 02/02/2024:  Lab Results   Component Value Date    HGB 11.7 (L) 09/14/2023    HCT 35.6 09/14/2023    MCV 85.6 09/14/2023     09/14/2023    WBC 16.59 (H) 09/14/2023    NEUTROABS 11.63 (H) 09/14/2023    LYMPHSABS 3.72 (H) 09/14/2023    MONOSABS 0.89 09/14/2023    EOSABS 0.14 09/14/2023    BASOSABS 0.07 09/14/2023     Lab Results   Component Value Date    GLUCOSE 124 (H) 09/06/2022    BUN 8 10/07/2023    CREATININE 0.7 10/07/2023     10/07/2023    K 3.7 10/07/2023     10/07/2023    CO2 25 10/07/2023    CALCIUM 8.9 10/07/2023    PROTEINTOT 6.9 09/06/2022    ALBUMIN 3.9 10/06/2023    BILITOT 0.39 10/06/2023    ALKPHOS 77 10/06/2023    AST 10 (L) 10/06/2023    ALT 8 10/06/2023        No results found for:  "\"IRON\", \"LABIRON\", \"TRANSFERRIN\", \"TIBC\"  No results found for: \"LDH\", \"FERRITIN\", \"[id number]\", \"FOLATE\"  No results found for: \"PSA\", \"CEA\", \"AFP\", \"\", \"\"    XR Spine Lumbar 1 View    Result Date: 12/26/2023  1. Stable L1 compression fracture deformity with changes suggestive of healing. 2. No new or other significant findings. [id number]    XR Spine Lumbar 1 View    Result Date: 11/15/2023  Mild increased height loss at the compression deformity of the superior endplate of L1. [id number]    CT pelvic bone    Result Date: 10/7/2023  1. Degenerative arthritis involving the hips, right worse than left. 2. No acute bony abnormality involving the pelvis. Workstation: [id number]    XR Spine Lumbar 2 or 3 View    Result Date: 10/7/2023     Superior endplate L1 fracture Workstation: [id number]           Assessment and Plan:  Diagnoses and all orders for this visit:    1. Leukocytosis, unspecified type (Primary)  -     CBC & Differential  -     Peripheral Blood Smear    2. Cigarette nicotine dependence with nicotine-induced disorder  -     CBC & Differential  -     Peripheral Blood Smear    Chronic leukocytosis in the setting of chronic tobacco abuse including other co-morbid illness (MS). She is currently with work up for possible alpha gal syndrome after having allergic type reaction with eating meat. Recently on steroids, she reports. WBC has been in the range of 14-18 with neutrophilia, lymphophilia noted but with normal morphology. No evidence for B symptoms or lymphadenopathy on clinical exam or patient history.     Leukocytosis likely reactive to underlying co-morbid illness, tobacco abuse, allergic reactions with possible alpha gal and steroid use.     Will check CBC today and peripheral smear. If any underlying abnormal cell morphology, then will send flow cytometry.     Will call with results.     Follow up with Dr. Howell as scheduled in September or sooner if needed.     Follow with " neurology for MS.     I spent 20 minutes caring for Katrin on this date of service. This time includes time spent by me in the following activities:preparing for the visit, reviewing tests, obtaining and/or reviewing a separately obtained history, performing a medically appropriate examination and/or evaluation , counseling and educating the patient/family/caregiver, ordering medications, tests, or procedures, referring and communicating with other health care professionals , documenting information in the medical record, independently interpreting results and communicating that information with the patient/family/caregiver, and care coordination    Patient Follow Up: as scheduled September with Dr. Howell.   Patient was given instructions and counseling regarding her condition or for health maintenance advice. Please see specific information pulled into the AVS if appropriate.     Khushbu Miller, APRN    2/2/2024    .[email or web address]

## 2024-02-21 DIAGNOSIS — D72.821 MONOCYTOSIS: ICD-10-CM

## 2024-02-21 DIAGNOSIS — D72.829 LEUKOCYTOSIS, UNSPECIFIED TYPE: Primary | ICD-10-CM

## 2024-02-21 DIAGNOSIS — D72.9 NEUTROPHILIA: ICD-10-CM

## 2024-02-26 ENCOUNTER — LAB (OUTPATIENT)
Dept: ONCOLOGY | Facility: HOSPITAL | Age: 59
End: 2024-02-26
Payer: COMMERCIAL

## 2024-02-26 DIAGNOSIS — D64.9 ANEMIA, UNSPECIFIED TYPE: Primary | ICD-10-CM

## 2024-02-26 LAB
ALBUMIN SERPL-MCNC: 4.3 G/DL (ref 3.5–5.2)
ALBUMIN/GLOB SERPL: 1.5 G/DL
ALP SERPL-CCNC: 95 U/L (ref 39–117)
ALT SERPL W P-5'-P-CCNC: 17 U/L (ref 1–33)
ANION GAP SERPL CALCULATED.3IONS-SCNC: 7.8 MMOL/L (ref 5–15)
AST SERPL-CCNC: 15 U/L (ref 1–32)
BASOPHILS # BLD AUTO: 0.03 10*3/MM3 (ref 0–0.2)
BASOPHILS NFR BLD AUTO: 0.2 % (ref 0–1.5)
BILIRUB SERPL-MCNC: 0.2 MG/DL (ref 0–1.2)
BUN SERPL-MCNC: 12 MG/DL (ref 6–20)
BUN/CREAT SERPL: 15 (ref 7–25)
CALCIUM SPEC-SCNC: 9.7 MG/DL (ref 8.6–10.5)
CHLORIDE SERPL-SCNC: 96 MMOL/L (ref 98–107)
CO2 SERPL-SCNC: 29.2 MMOL/L (ref 22–29)
CREAT SERPL-MCNC: 0.8 MG/DL (ref 0.57–1)
DEPRECATED RDW RBC AUTO: 47.6 FL (ref 37–54)
EGFRCR SERPLBLD CKD-EPI 2021: 86.1 ML/MIN/1.73
EOSINOPHIL # BLD AUTO: 0.07 10*3/MM3 (ref 0–0.4)
EOSINOPHIL NFR BLD AUTO: 0.5 % (ref 0.3–6.2)
ERYTHROCYTE [DISTWIDTH] IN BLOOD BY AUTOMATED COUNT: 14 % (ref 12.3–15.4)
GLOBULIN UR ELPH-MCNC: 2.9 GM/DL
GLUCOSE SERPL-MCNC: 148 MG/DL (ref 65–99)
HCT VFR BLD AUTO: 39.8 % (ref 34–46.6)
HGB BLD-MCNC: 12.6 G/DL (ref 12–15.9)
IMM GRANULOCYTES # BLD AUTO: 0.07 10*3/MM3 (ref 0–0.05)
IMM GRANULOCYTES NFR BLD AUTO: 0.5 % (ref 0–0.5)
LYMPHOCYTES # BLD AUTO: 2.5 10*3/MM3 (ref 0.7–3.1)
LYMPHOCYTES NFR BLD AUTO: 16.4 % (ref 19.6–45.3)
MCH RBC QN AUTO: 28.8 PG (ref 26.6–33)
MCHC RBC AUTO-ENTMCNC: 31.7 G/DL (ref 31.5–35.7)
MCV RBC AUTO: 91.1 FL (ref 79–97)
MONOCYTES # BLD AUTO: 1.01 10*3/MM3 (ref 0.1–0.9)
MONOCYTES NFR BLD AUTO: 6.6 % (ref 5–12)
NEUTROPHILS NFR BLD AUTO: 11.61 10*3/MM3 (ref 1.7–7)
NEUTROPHILS NFR BLD AUTO: 75.8 % (ref 42.7–76)
PLATELET # BLD AUTO: 406 10*3/MM3 (ref 140–450)
PMV BLD AUTO: 8.8 FL (ref 6–12)
POTASSIUM SERPL-SCNC: 3.7 MMOL/L (ref 3.5–5.2)
PROT SERPL-MCNC: 7.2 G/DL (ref 6–8.5)
RBC # BLD AUTO: 4.37 10*6/MM3 (ref 3.77–5.28)
SODIUM SERPL-SCNC: 133 MMOL/L (ref 136–145)
WBC NRBC COR # BLD AUTO: 15.29 10*3/MM3 (ref 3.4–10.8)

## 2024-02-26 PROCEDURE — 80053 COMPREHEN METABOLIC PANEL: CPT | Performed by: NURSE PRACTITIONER

## 2024-02-26 PROCEDURE — 85025 COMPLETE CBC W/AUTO DIFF WBC: CPT | Performed by: NURSE PRACTITIONER

## 2024-02-27 LAB — Lab: NORMAL

## 2024-02-28 LAB — BCR ABL RESULT: NORMAL

## 2024-02-29 LAB — CCV RESULT: NORMAL

## 2024-09-16 DIAGNOSIS — D72.829 LEUKOCYTOSIS, UNSPECIFIED TYPE: Primary | ICD-10-CM

## 2024-09-17 ENCOUNTER — OFFICE VISIT (OUTPATIENT)
Dept: ONCOLOGY | Facility: HOSPITAL | Age: 59
End: 2024-09-17
Payer: COMMERCIAL

## 2024-09-17 ENCOUNTER — LAB (OUTPATIENT)
Dept: ONCOLOGY | Facility: HOSPITAL | Age: 59
End: 2024-09-17
Payer: COMMERCIAL

## 2024-09-17 VITALS
HEART RATE: 96 BPM | OXYGEN SATURATION: 96 % | SYSTOLIC BLOOD PRESSURE: 119 MMHG | DIASTOLIC BLOOD PRESSURE: 73 MMHG | WEIGHT: 189.15 LBS | RESPIRATION RATE: 18 BRPM | BODY MASS INDEX: 35.74 KG/M2 | TEMPERATURE: 97.2 F

## 2024-09-17 DIAGNOSIS — D72.829 LEUKOCYTOSIS, UNSPECIFIED TYPE: ICD-10-CM

## 2024-09-17 DIAGNOSIS — D72.829 LEUKOCYTOSIS, UNSPECIFIED TYPE: Primary | ICD-10-CM

## 2024-09-17 LAB
BASOPHILS # BLD AUTO: 0.02 10*3/MM3 (ref 0–0.2)
BASOPHILS NFR BLD AUTO: 0.2 % (ref 0–1.5)
DEPRECATED RDW RBC AUTO: 48.1 FL (ref 37–54)
EOSINOPHIL # BLD AUTO: 0.15 10*3/MM3 (ref 0–0.4)
EOSINOPHIL NFR BLD AUTO: 1.2 % (ref 0.3–6.2)
ERYTHROCYTE [DISTWIDTH] IN BLOOD BY AUTOMATED COUNT: 14.4 % (ref 12.3–15.4)
HCT VFR BLD AUTO: 40.7 % (ref 34–46.6)
HGB BLD-MCNC: 12.8 G/DL (ref 12–15.9)
IMM GRANULOCYTES # BLD AUTO: 0.04 10*3/MM3 (ref 0–0.05)
IMM GRANULOCYTES NFR BLD AUTO: 0.3 % (ref 0–0.5)
LYMPHOCYTES # BLD AUTO: 3.21 10*3/MM3 (ref 0.7–3.1)
LYMPHOCYTES NFR BLD AUTO: 24.9 % (ref 19.6–45.3)
MCH RBC QN AUTO: 28.3 PG (ref 26.6–33)
MCHC RBC AUTO-ENTMCNC: 31.4 G/DL (ref 31.5–35.7)
MCV RBC AUTO: 89.8 FL (ref 79–97)
MONOCYTES # BLD AUTO: 0.83 10*3/MM3 (ref 0.1–0.9)
MONOCYTES NFR BLD AUTO: 6.4 % (ref 5–12)
NEUTROPHILS NFR BLD AUTO: 67 % (ref 42.7–76)
NEUTROPHILS NFR BLD AUTO: 8.66 10*3/MM3 (ref 1.7–7)
PLATELET # BLD AUTO: 370 10*3/MM3 (ref 140–450)
PMV BLD AUTO: 8.6 FL (ref 6–12)
RBC # BLD AUTO: 4.53 10*6/MM3 (ref 3.77–5.28)
WBC NRBC COR # BLD AUTO: 12.91 10*3/MM3 (ref 3.4–10.8)

## 2024-09-17 PROCEDURE — 99213 OFFICE O/P EST LOW 20 MIN: CPT | Performed by: INTERNAL MEDICINE

## 2024-09-17 PROCEDURE — 85025 COMPLETE CBC W/AUTO DIFF WBC: CPT

## 2024-09-17 PROCEDURE — 36415 COLL VENOUS BLD VENIPUNCTURE: CPT

## 2024-09-17 RX ORDER — TRAMADOL HYDROCHLORIDE 50 MG/1
50 TABLET ORAL 2 TIMES DAILY PRN
COMMUNITY

## 2024-09-17 RX ORDER — AMITRIPTYLINE HYDROCHLORIDE 50 MG/1
50 TABLET ORAL
COMMUNITY
Start: 2024-07-16